# Patient Record
Sex: FEMALE | Race: WHITE | NOT HISPANIC OR LATINO | Employment: OTHER | URBAN - METROPOLITAN AREA
[De-identification: names, ages, dates, MRNs, and addresses within clinical notes are randomized per-mention and may not be internally consistent; named-entity substitution may affect disease eponyms.]

---

## 2017-01-04 ENCOUNTER — HOSPITAL ENCOUNTER (EMERGENCY)
Facility: HOSPITAL | Age: 52
Discharge: PSYCHIATRIC OR SUBSTANCE USE FACILITY | End: 2017-01-05
Attending: EMERGENCY MEDICINE
Payer: MEDICAID

## 2017-01-04 DIAGNOSIS — T14.91XA SUICIDE ATTEMPT: Primary | ICD-10-CM

## 2017-01-04 LAB
ALBUMIN SERPL BCP-MCNC: 3.5 G/DL
ALP SERPL-CCNC: 71 U/L
ALT SERPL W/O P-5'-P-CCNC: 17 U/L
AMPHET+METHAMPHET UR QL: NEGATIVE
ANION GAP SERPL CALC-SCNC: 11 MMOL/L
APAP SERPL-MCNC: 21 UG/ML
AST SERPL-CCNC: 18 U/L
B-HCG UR QL: NEGATIVE
BARBITURATES UR QL SCN>200 NG/ML: NEGATIVE
BASOPHILS # BLD AUTO: 0.1 K/UL
BASOPHILS NFR BLD: 0.7 %
BENZODIAZ UR QL SCN>200 NG/ML: NEGATIVE
BILIRUB SERPL-MCNC: 0.2 MG/DL
BILIRUB UR QL STRIP: NEGATIVE
BUN SERPL-MCNC: 6 MG/DL
BZE UR QL SCN: NEGATIVE
CALCIUM SERPL-MCNC: 8.6 MG/DL
CANNABINOIDS UR QL SCN: NEGATIVE
CHLORIDE SERPL-SCNC: 104 MMOL/L
CK SERPL-CCNC: 181 U/L
CLARITY UR: CLEAR
CO2 SERPL-SCNC: 23 MMOL/L
COLOR UR: YELLOW
CREAT SERPL-MCNC: 0.8 MG/DL
CREAT UR-MCNC: 14.1 MG/DL
CTP QC/QA: YES
DIFFERENTIAL METHOD: ABNORMAL
EOSINOPHIL # BLD AUTO: 0.2 K/UL
EOSINOPHIL NFR BLD: 1.9 %
ERYTHROCYTE [DISTWIDTH] IN BLOOD BY AUTOMATED COUNT: 15.7 %
EST. GFR  (AFRICAN AMERICAN): >60 ML/MIN/1.73 M^2
EST. GFR  (NON AFRICAN AMERICAN): >60 ML/MIN/1.73 M^2
ETHANOL SERPL-MCNC: 123 MG/DL
GLUCOSE SERPL-MCNC: 98 MG/DL
GLUCOSE UR QL STRIP: NEGATIVE
HCT VFR BLD AUTO: 30.8 %
HGB BLD-MCNC: 9.8 G/DL
HGB UR QL STRIP: ABNORMAL
KETONES UR QL STRIP: NEGATIVE
LEUKOCYTE ESTERASE UR QL STRIP: NEGATIVE
LYMPHOCYTES # BLD AUTO: 2.1 K/UL
LYMPHOCYTES NFR BLD: 23.2 %
MCH RBC QN AUTO: 24.7 PG
MCHC RBC AUTO-ENTMCNC: 31.9 %
MCV RBC AUTO: 78 FL
METHADONE UR QL SCN>300 NG/ML: NEGATIVE
MONOCYTES # BLD AUTO: 0.7 K/UL
MONOCYTES NFR BLD: 7.8 %
NEUTROPHILS # BLD AUTO: 6.1 K/UL
NEUTROPHILS NFR BLD: 66.4 %
NITRITE UR QL STRIP: NEGATIVE
OPIATES UR QL SCN: NEGATIVE
PCP UR QL SCN>25 NG/ML: NEGATIVE
PH UR STRIP: 6 [PH] (ref 5–8)
PLATELET # BLD AUTO: 354 K/UL
PMV BLD AUTO: 8.3 FL
POTASSIUM SERPL-SCNC: 3.7 MMOL/L
PROT SERPL-MCNC: 7 G/DL
PROT UR QL STRIP: NEGATIVE
RBC # BLD AUTO: 3.97 M/UL
SALICYLATES SERPL-MCNC: <5 MG/DL
SODIUM SERPL-SCNC: 138 MMOL/L
SP GR UR STRIP: <=1.005 (ref 1–1.03)
TOXICOLOGY INFORMATION: ABNORMAL
TSH SERPL DL<=0.005 MIU/L-ACNC: 1.7 UIU/ML
URN SPEC COLLECT METH UR: ABNORMAL
UROBILINOGEN UR STRIP-ACNC: NEGATIVE EU/DL
WBC # BLD AUTO: 9.2 K/UL

## 2017-01-04 PROCEDURE — 81003 URINALYSIS AUTO W/O SCOPE: CPT

## 2017-01-04 PROCEDURE — 96375 TX/PRO/DX INJ NEW DRUG ADDON: CPT

## 2017-01-04 PROCEDURE — 85025 COMPLETE CBC W/AUTO DIFF WBC: CPT

## 2017-01-04 PROCEDURE — 99291 CRITICAL CARE FIRST HOUR: CPT | Mod: 25

## 2017-01-04 PROCEDURE — 80307 DRUG TEST PRSMV CHEM ANLYZR: CPT

## 2017-01-04 PROCEDURE — 84443 ASSAY THYROID STIM HORMONE: CPT

## 2017-01-04 PROCEDURE — 36415 COLL VENOUS BLD VENIPUNCTURE: CPT

## 2017-01-04 PROCEDURE — 63600175 PHARM REV CODE 636 W HCPCS: Performed by: EMERGENCY MEDICINE

## 2017-01-04 PROCEDURE — 63600175 PHARM REV CODE 636 W HCPCS

## 2017-01-04 PROCEDURE — 93005 ELECTROCARDIOGRAM TRACING: CPT

## 2017-01-04 PROCEDURE — 80329 ANALGESICS NON-OPIOID 1 OR 2: CPT

## 2017-01-04 PROCEDURE — 96374 THER/PROPH/DIAG INJ IV PUSH: CPT

## 2017-01-04 PROCEDURE — 81025 URINE PREGNANCY TEST: CPT | Performed by: EMERGENCY MEDICINE

## 2017-01-04 PROCEDURE — 80320 DRUG SCREEN QUANTALCOHOLS: CPT

## 2017-01-04 PROCEDURE — 82570 ASSAY OF URINE CREATININE: CPT

## 2017-01-04 PROCEDURE — 80053 COMPREHEN METABOLIC PANEL: CPT

## 2017-01-04 PROCEDURE — 82550 ASSAY OF CK (CPK): CPT

## 2017-01-04 RX ORDER — MIDAZOLAM HYDROCHLORIDE 1 MG/ML
INJECTION, SOLUTION INTRAMUSCULAR; INTRAVENOUS
Status: COMPLETED
Start: 2017-01-04 | End: 2017-01-04

## 2017-01-04 RX ORDER — MIDAZOLAM HYDROCHLORIDE 5 MG/ML
2 INJECTION INTRAMUSCULAR; INTRAVENOUS
Status: COMPLETED | OUTPATIENT
Start: 2017-01-04 | End: 2017-01-04

## 2017-01-04 RX ORDER — DIAZEPAM 10 MG/2ML
5 INJECTION INTRAMUSCULAR
Status: ACTIVE | OUTPATIENT
Start: 2017-01-04 | End: 2017-01-05

## 2017-01-04 RX ORDER — CLONAZEPAM 0.5 MG/1
0.5 TABLET ORAL 2 TIMES DAILY PRN
COMMUNITY

## 2017-01-04 RX ORDER — DULOXETIN HYDROCHLORIDE 60 MG/1
60 CAPSULE, DELAYED RELEASE ORAL 2 TIMES DAILY
COMMUNITY

## 2017-01-04 RX ORDER — LORAZEPAM 2 MG/ML
1 INJECTION INTRAMUSCULAR
Status: COMPLETED | OUTPATIENT
Start: 2017-01-04 | End: 2017-01-04

## 2017-01-04 RX ORDER — GABAPENTIN 100 MG/1
100 CAPSULE ORAL 3 TIMES DAILY
COMMUNITY

## 2017-01-04 RX ORDER — LORAZEPAM 2 MG/ML
0.5 INJECTION INTRAMUSCULAR
Status: COMPLETED | OUTPATIENT
Start: 2017-01-04 | End: 2017-01-04

## 2017-01-04 RX ORDER — AMITRIPTYLINE HYDROCHLORIDE 50 MG/1
50 TABLET, FILM COATED ORAL NIGHTLY
COMMUNITY

## 2017-01-04 RX ORDER — DIAZEPAM 10 MG/2ML
5 INJECTION INTRAMUSCULAR
Status: COMPLETED | OUTPATIENT
Start: 2017-01-04 | End: 2017-01-04

## 2017-01-04 RX ADMIN — MIDAZOLAM 2 MG: 5 INJECTION INTRAMUSCULAR; INTRAVENOUS at 10:01

## 2017-01-04 RX ADMIN — LORAZEPAM 1 MG: 2 INJECTION INTRAMUSCULAR; INTRAVENOUS at 09:01

## 2017-01-04 RX ADMIN — LORAZEPAM 0.5 MG: 2 INJECTION INTRAMUSCULAR; INTRAVENOUS at 09:01

## 2017-01-04 RX ADMIN — DIAZEPAM 5 MG: 5 INJECTION, SOLUTION INTRAMUSCULAR; INTRAVENOUS at 11:01

## 2017-01-04 RX ADMIN — MIDAZOLAM HYDROCHLORIDE 2 MG: 1 INJECTION, SOLUTION INTRAMUSCULAR; INTRAVENOUS at 10:01

## 2017-01-05 VITALS
OXYGEN SATURATION: 100 % | RESPIRATION RATE: 16 BRPM | HEART RATE: 79 BPM | TEMPERATURE: 98 F | DIASTOLIC BLOOD PRESSURE: 67 MMHG | SYSTOLIC BLOOD PRESSURE: 113 MMHG | BODY MASS INDEX: 31.18 KG/M2 | WEIGHT: 176 LBS | HEIGHT: 63 IN

## 2017-01-05 LAB
APAP SERPL-MCNC: 5 UG/ML
ETHANOL SERPL-MCNC: 21 MG/DL

## 2017-01-05 PROCEDURE — 36415 COLL VENOUS BLD VENIPUNCTURE: CPT

## 2017-01-05 PROCEDURE — 80329 ANALGESICS NON-OPIOID 1 OR 2: CPT

## 2017-01-05 PROCEDURE — 80320 DRUG SCREEN QUANTALCOHOLS: CPT

## 2017-01-05 NOTE — ED PROVIDER NOTES
Encounter Date: 1/4/2017    SCRIBE #1 NOTE: I, Spike Singh, am scribing for, and in the presence of, Dr. Walsh.       History     Chief Complaint   Patient presents with    Drug Overdose     possible Klonopin, Elavil, Prozac and Neurontin of unknown amounts-pt overdosed this past February     Review of patient's allergies indicates:  No Known Allergies  HPI Comments: 01/04/2017  8:52 PM     Chief Complaint: Drug Overdose      Gianna Santos is a 52 y.o. female who is presenting with a sudden onset of an acute drug overdose beginning 1 hr ago. EMS reported the pt was found unresponsive after ingesting 6 beers, 6 Klonopin, 3 Neurontin, 3 amitriptyline, and 1 Cymbalta. EMS reported the pt has been depressed due to son passing away recently. EMS stated she has a hx of SI and PEC. Associated symptoms of chest pain and SI. The pt has no past medical history on file. She has a past surgical history that includes Neck surgery.    The history is provided by the patient.     Past Medical History   Diagnosis Date    Anxiety     Depression      No past medical history pertinent negatives.  Past Surgical History   Procedure Laterality Date    Neck surgery       No family history on file.  Social History   Substance Use Topics    Smoking status: Never Smoker    Smokeless tobacco: None    Alcohol use None     Review of Systems   Constitutional: Negative for fever.        + Drug Overdose   HENT: Negative for sore throat.    Respiratory: Negative for shortness of breath.    Cardiovascular: Positive for chest pain.   Gastrointestinal: Negative for nausea.   Genitourinary: Negative for dysuria.   Musculoskeletal: Negative for back pain.   Skin: Negative for rash.   Neurological: Negative for weakness.   Hematological: Does not bruise/bleed easily.   Psychiatric/Behavioral: Positive for suicidal ideas.       Physical Exam   Initial Vitals   BP Pulse Resp Temp SpO2   01/04/17 2048 01/04/17 2048 01/04/17 2048 01/04/17 2048  01/04/17 2048   119/71 96 16 97.5 °F (36.4 °C) 92 %     Physical Exam    Nursing note and vitals reviewed.  Constitutional: She appears well-developed.   HENT:   Head: Normocephalic and atraumatic.   Mouth/Throat: Oropharynx is clear and moist.   Eyes: Conjunctivae are normal.   Neck: Neck supple.   Cardiovascular: Normal rate, regular rhythm, normal heart sounds and intact distal pulses. Exam reveals no gallop and no friction rub.    No murmur heard.  Pulmonary/Chest: Breath sounds normal. She has no wheezes. She has no rhonchi. She has no rales.   Abdominal: Soft. She exhibits no distension. There is no tenderness.   Abdomen obese   Musculoskeletal: Normal range of motion.   Neurological:   GCS: eyes open at baseline, slurred speech, and spontaneous movement   Skin: No rash noted. No erythema.   Psychiatric: Her speech is slurred.   Somnolent  Answers some questions and follows some commands         ED Course   Critical Care  Date/Time: 1/5/2017 1:16 AM  Performed by: RUBIN LE  Authorized by: RUBIN LE   Direct patient critical care time: 50 minutes  Additional history critical care time: 5 minutes  Ordering / reviewing critical care time: 10 minutes  Documentation critical care time: 10 minutes  Total critical care time (exclusive of procedural time) : 75 minutes  Comments: Critical Care time: 75 minutes inclusive of direct patient care, review of previous records, interpretation of labs, imaging and ekg, as well as discussion of my impression and plan of care with the patient, family and other clinicians/consultants. This time is exclusive of any separate billable procedures and of treating other patients. Critical care was required for this patient who presented with drug overdose, followed by agitation and delirium requiring my emergent evaluation and management in the emergency department.          Labs Reviewed   CBC W/ AUTO DIFFERENTIAL - Abnormal; Notable for the following:        Result Value     RBC 3.97 (*)     Hemoglobin 9.8 (*)     Hematocrit 30.8 (*)     MCV 78 (*)     MCH 24.7 (*)     MCHC 31.9 (*)     RDW 15.7 (*)     Platelets 354 (*)     MPV 8.3 (*)     All other components within normal limits   COMPREHENSIVE METABOLIC PANEL - Abnormal; Notable for the following:     Calcium 8.6 (*)     All other components within normal limits   URINALYSIS - Abnormal; Notable for the following:     Specific Gravity, UA <=1.005 (*)     Occult Blood UA Trace (*)     All other components within normal limits   DRUG SCREEN PANEL, URINE EMERGENCY - Abnormal; Notable for the following:     Creatinine, Random Ur 14.1 (*)     All other components within normal limits   ALCOHOL,MEDICAL (ETHANOL) - Abnormal; Notable for the following:     Alcohol, Medical, Serum 123 (*)     All other components within normal limits   ACETAMINOPHEN LEVEL - Abnormal; Notable for the following:     Acetaminophen (Tylenol), Serum 21.0 (*)     All other components within normal limits   SALICYLATE LEVEL - Abnormal; Notable for the following:     Salicylate Lvl <5.0 (*)     All other components within normal limits   CK - Abnormal; Notable for the following:      (*)     All other components within normal limits   ACETAMINOPHEN LEVEL - Abnormal; Notable for the following:     Acetaminophen (Tylenol), Serum 5.0 (*)     All other components within normal limits   ALCOHOL,MEDICAL (ETHANOL) - Abnormal; Notable for the following:     Alcohol, Medical, Serum 21 (*)     All other components within normal limits   TSH   POCT URINE PREGNANCY             Medical Decision Making:   Initial Assessment:   8:57p  Called poison center who recommended watch for CNS, respiratory decrease, tachycardia, QRS widen seizure. Do not think that small amount of amitriptyline would cause significant toxicity but will watch for any changes.   Clinical Tests:   Lab Tests: Ordered and Reviewed  Medical Tests: Ordered and Reviewed            Scribe Attestation:    Scribe #1: I performed the above scribed service and the documentation accurately describes the services I performed. I attest to the accuracy of the note.    Attending Attestation:           Physician Attestation for Scribe:  Physician Attestation Statement for Scribe #1: I, Dr. Walsh, reviewed documentation, as scribed by Spike Singh in my presence, and it is both accurate and complete.         Attending ED Notes:   Course in the emergency department remarkable for patient awaking from her somnolent state and becoming acutely agitated, confused, delirious.  Patient not hypoxic on pulse ox.  No significant arrhythmia.  Patient was given multiple benzodiazepines, physically restrain as well, with subsequent correction of her agitation.    Patient became more calm and cooperative, more mentally alert and answering questions.  Few restraints were removed and patient still cooperating.    My overall impression is suicide attempt by drug overdose, alcohol intoxication, acute delirium. patient is PEC'd and pending repeat tylenol, ETOH for medical clearance. Pt does not have rhabdo based on CPK level.    Repeat evaluation in the morning and patient is comfortable, awake and answering questions.  The remainder of her to restraints were removed.  Repeat Tylenol and alcohol levels were unremarkable.  She is medically cleared at this time for psychiatric placement.          ED Course   Value Comment By Time   Alcohol, Medical, Serum: (!) 123 (Reviewed) Franck Walsh MD 01/04 2218   Acetaminophen (Tylenol), Serum: (!) 21.0 (Reviewed) Franck Walsh MD 01/04 2218   WBC: 9.20 (Reviewed) Franck Walsh MD 01/04 2218   Hemoglobin: (!) 9.8 (Reviewed) Franck Walsh MD 01/04 2218   Hematocrit: (!) 30.8 (Reviewed) Franck Walsh MD 01/04 2218   Sodium: 138 (Reviewed) Franck Walsh MD 01/04 2218   Potassium: 3.7 (Reviewed) Franck Walsh MD 01/04 2218   Chloride: 104 (Reviewed) Franck Walsh MD 01/04 2218   CO2: 23 (Reviewed)  Franck Walsh MD 01/04 2218   Glucose: 98 (Reviewed) Franck Walsh MD 01/04 2218   BUN, Bld: 6 (Reviewed) Franck Walsh MD 01/04 2218   Creatinine: 0.8 (Reviewed) Franck Walsh MD 01/04 2218   Preg Test, Ur: Negative (Reviewed) Franck Walsh MD 01/04 2218   Salicylate Lvl: (!) <5.0 (Reviewed) Franck Walsh MD 01/04 2218     Clinical Impression:   The primary encounter diagnosis was Suicide attempt. A diagnosis of Drug ingestion, intentional self-harm, initial encounter was also pertinent to this visit.          Franck Walsh MD  01/06/17 0646

## 2017-01-05 NOTE — ED NOTES
Pt behaving erratic - screaming -  thrashing around, Md called to bedside, advised to give the entire 2mg vial of ativan due to pt condition.

## 2017-01-05 NOTE — ED NOTES
Pt continues screaming - thrashing - broke right wrist restraint. Security at the bedside. Reapplied by Rn - md called to bedside. New orders given.

## 2017-01-05 NOTE — ED NOTES
Pt violent behavior continues - pt spitting / swinging/ kicking staff. Md and security at the bedside order for leather restraints given.

## 2017-01-05 NOTE — ED NOTES
PEC Received in [facility]. Will actively begin seeking inpatient psych placement.

## 2017-01-05 NOTE — ED NOTES
Patient accepted by Stephie at [facility], [address] [place] [address], for the service of Dr. Brewer.  Report to be called to [phone].

## 2017-01-05 NOTE — ED NOTES
Admit packet faxed to [facility], [facility], [facility], [facility], [facility], [facility], [facility], , [facility], [facility], [facility] and [facility]. Waiting for response.

## 2019-02-26 ENCOUNTER — HOSPITAL ENCOUNTER (EMERGENCY)
Facility: HOSPITAL | Age: 54
Discharge: HOME | End: 2019-02-26
Attending: EMERGENCY MEDICINE
Payer: MEDICAID

## 2019-02-26 VITALS
HEIGHT: 63 IN | HEART RATE: 80 BPM | RESPIRATION RATE: 14 BRPM | OXYGEN SATURATION: 97 % | SYSTOLIC BLOOD PRESSURE: 133 MMHG | TEMPERATURE: 99 F | DIASTOLIC BLOOD PRESSURE: 58 MMHG | BODY MASS INDEX: 31.18 KG/M2 | WEIGHT: 176 LBS

## 2019-02-26 DIAGNOSIS — R07.9 CHEST PAIN: ICD-10-CM

## 2019-02-26 DIAGNOSIS — M54.9 BACK PAIN, UNSPECIFIED BACK LOCATION, UNSPECIFIED BACK PAIN LATERALITY, UNSPECIFIED CHRONICITY: Primary | ICD-10-CM

## 2019-02-26 LAB
ALBUMIN SERPL BCP-MCNC: 4.1 G/DL
ALP SERPL-CCNC: 66 U/L
ALT SERPL W/O P-5'-P-CCNC: 17 U/L
ANION GAP SERPL CALC-SCNC: 10 MMOL/L
AST SERPL-CCNC: 16 U/L
BASOPHILS # BLD AUTO: 0 K/UL
BASOPHILS NFR BLD: 0.5 %
BILIRUB SERPL-MCNC: 0.3 MG/DL
BNP SERPL-MCNC: 37 PG/ML
BUN SERPL-MCNC: 12 MG/DL
CALCIUM SERPL-MCNC: 9.3 MG/DL
CHLORIDE SERPL-SCNC: 106 MMOL/L
CO2 SERPL-SCNC: 24 MMOL/L
CREAT SERPL-MCNC: 0.9 MG/DL
CRP SERPL-MCNC: 5 MG/L
D DIMER PPP IA.FEU-MCNC: 0.43 MG/L FEU
DIFFERENTIAL METHOD: ABNORMAL
EOSINOPHIL # BLD AUTO: 0.1 K/UL
EOSINOPHIL NFR BLD: 1.4 %
ERYTHROCYTE [DISTWIDTH] IN BLOOD BY AUTOMATED COUNT: 15.4 %
ERYTHROCYTE [SEDIMENTATION RATE] IN BLOOD BY WESTERGREN METHOD: 17 MM/HR
EST. GFR  (AFRICAN AMERICAN): >60 ML/MIN/1.73 M^2
EST. GFR  (NON AFRICAN AMERICAN): >60 ML/MIN/1.73 M^2
GLUCOSE SERPL-MCNC: 101 MG/DL
HCT VFR BLD AUTO: 36.6 %
HGB BLD-MCNC: 11.7 G/DL
LIPASE SERPL-CCNC: 27 U/L
LYMPHOCYTES # BLD AUTO: 2.1 K/UL
LYMPHOCYTES NFR BLD: 24.7 %
MCH RBC QN AUTO: 26.2 PG
MCHC RBC AUTO-ENTMCNC: 32 G/DL
MCV RBC AUTO: 82 FL
MONOCYTES # BLD AUTO: 0.6 K/UL
MONOCYTES NFR BLD: 6.6 %
NEUTROPHILS # BLD AUTO: 5.8 K/UL
NEUTROPHILS NFR BLD: 66.8 %
PLATELET # BLD AUTO: 280 K/UL
PMV BLD AUTO: 9.8 FL
POTASSIUM SERPL-SCNC: 3.6 MMOL/L
PROT SERPL-MCNC: 7.6 G/DL
RBC # BLD AUTO: 4.47 M/UL
SODIUM SERPL-SCNC: 140 MMOL/L
TROPONIN I SERPL DL<=0.01 NG/ML-MCNC: 0.01 NG/ML
WBC # BLD AUTO: 8.6 K/UL

## 2019-02-26 PROCEDURE — 84484 ASSAY OF TROPONIN QUANT: CPT

## 2019-02-26 PROCEDURE — 99285 EMERGENCY DEPT VISIT HI MDM: CPT | Mod: 25

## 2019-02-26 PROCEDURE — 80053 COMPREHEN METABOLIC PANEL: CPT

## 2019-02-26 PROCEDURE — 86140 C-REACTIVE PROTEIN: CPT

## 2019-02-26 PROCEDURE — 85379 FIBRIN DEGRADATION QUANT: CPT

## 2019-02-26 PROCEDURE — 93005 ELECTROCARDIOGRAM TRACING: CPT

## 2019-02-26 PROCEDURE — 63600175 PHARM REV CODE 636 W HCPCS: Performed by: EMERGENCY MEDICINE

## 2019-02-26 PROCEDURE — 83690 ASSAY OF LIPASE: CPT

## 2019-02-26 PROCEDURE — 85651 RBC SED RATE NONAUTOMATED: CPT

## 2019-02-26 PROCEDURE — 25000003 PHARM REV CODE 250: Performed by: EMERGENCY MEDICINE

## 2019-02-26 PROCEDURE — 83880 ASSAY OF NATRIURETIC PEPTIDE: CPT

## 2019-02-26 PROCEDURE — 96374 THER/PROPH/DIAG INJ IV PUSH: CPT

## 2019-02-26 PROCEDURE — 85025 COMPLETE CBC W/AUTO DIFF WBC: CPT

## 2019-02-26 PROCEDURE — 36415 COLL VENOUS BLD VENIPUNCTURE: CPT

## 2019-02-26 RX ORDER — IBUPROFEN 800 MG/1
800 TABLET ORAL 3 TIMES DAILY
COMMUNITY

## 2019-02-26 RX ORDER — ASPIRIN 325 MG
325 TABLET ORAL
Status: COMPLETED | OUTPATIENT
Start: 2019-02-26 | End: 2019-02-26

## 2019-02-26 RX ORDER — MORPHINE SULFATE 4 MG/ML
4 INJECTION, SOLUTION INTRAMUSCULAR; INTRAVENOUS
Status: COMPLETED | OUTPATIENT
Start: 2019-02-26 | End: 2019-02-26

## 2019-02-26 RX ADMIN — MORPHINE SULFATE 4 MG: 4 INJECTION, SOLUTION INTRAMUSCULAR; INTRAVENOUS at 01:02

## 2019-02-26 RX ADMIN — ASPIRIN 325 MG ORAL TABLET 325 MG: 325 PILL ORAL at 01:02

## 2019-02-26 NOTE — ED NOTES
Pt. AAOx4. Presents with complaints of LUQ and RUQ abdominal pain, right-sided back pain, and also states she has chest heaviness and SOB that started 2-3 days ago. NAD noted, denies NVD. BBS clear, S1S2 noted at PMI, 2+ radial pulses, B.S. Active x4 quads, voids spontaneously, 2+ DP pulses, no edema noted. VIRGILIO castro.

## 2019-02-26 NOTE — ED PROVIDER NOTES
Encounter Date: 2/26/2019    SCRIBE #1 NOTE: I, Farida Bhatt, am scribing for, and in the presence of, Roberto Cruz MD.       History     Chief Complaint   Patient presents with    Chest Pain     Pain across chest, back and L hip x 2 weeks, not relieved with massage.       Time seen by provider: 1:27 PM on 02/26/2019    Gianna Santos is a 54 y.o. female with depression and anxiety who presents to the ED with an onset of bilateral med back and mid upper abdominal pain. She began to endorse symptoms 2 weeks ago and was not able to get an appointment with her PCP. Since onset, the patient got a massage with no improvement in her pain. Her pain is worsened with deep breathing.  Some radiation to the chest.  She also complains of SOB and states she can hardly sit due to the pain. The patient endorses chronic back pain secondary to her sciatica with LLE numbness that is unchanged from baseline. She denies smoking tobacco, drinking alcohol, cold-like symptoms, or any other symptoms at this time. The patient has a SHx including a neck surgery ~5 years ago. No known drug allergies noted.      The history is provided by the patient.     Review of patient's allergies indicates:  No Known Allergies  Past Medical History:   Diagnosis Date    Anxiety     Depression      Past Surgical History:   Procedure Laterality Date    NECK SURGERY       History reviewed. No pertinent family history.  Social History     Tobacco Use    Smoking status: Never Smoker   Substance Use Topics    Alcohol use: Not on file    Drug use: Not on file     Review of Systems   Constitutional: Negative for fever.   HENT: Negative for postnasal drip, rhinorrhea and sore throat.    Respiratory: Positive for shortness of breath. Negative for cough.    Cardiovascular: Positive for chest pain. Negative for leg swelling.   Gastrointestinal: Positive for abdominal pain (epigastric). Negative for diarrhea, nausea and vomiting.   Genitourinary: Negative for  flank pain.   Musculoskeletal: Positive for back pain (thoracic).   Skin: Negative for rash.   Neurological: Positive for numbness (chronic LLE, unchanged). Negative for headaches.   All other systems reviewed and are negative.    Physical Exam     Initial Vitals [02/26/19 1313]   BP Pulse Resp Temp SpO2   (!) 163/73 88 14 98.8 °F (37.1 °C) 99 %      MAP       --         Physical Exam    Nursing note and vitals reviewed.  Constitutional: She appears well-developed and well-nourished. She is not diaphoretic.  Non-toxic appearance. She does not have a sickly appearance. She does not appear ill. No distress.   Well-appearing smiling jovial   HENT:   Head: Normocephalic and atraumatic.   Eyes: EOM are normal.   Neck: Normal range of motion and full passive range of motion without pain. Neck supple.   Cardiovascular: Normal rate, regular rhythm and normal heart sounds. Exam reveals no gallop and no friction rub.    No murmur heard.  Pulses:       Radial pulses are 2+ on the right side, and 2+ on the left side.        Dorsalis pedis pulses are 2+ on the right side, and 2+ on the left side.   Pulmonary/Chest: Breath sounds normal. No respiratory distress. She has no wheezes. She has no rhonchi. She has no rales.   Abdominal: She exhibits no distension. There is tenderness in the right upper quadrant and epigastric area. There is no rebound and no guarding.       Musculoskeletal: Normal range of motion.        Thoracic back: She exhibits tenderness and bony tenderness. She exhibits normal range of motion, no swelling and no edema.        Lumbar back: She exhibits tenderness and bony tenderness. She exhibits normal range of motion and no swelling.        Back:    T- and L-spine tenderness.    Neurological: She is alert and oriented to person, place, and time.   BLE hip flexion and extension is normal. Knee flex and ex also normal. EHL strength normal. Dorsal and plantar flexion of the foot is normal. Normal sensation to  light touch in feet in deep peroneal, superficial peroneal and saphenous and sural nerves.     Skin: Skin is warm and dry.   Psychiatric: She has a normal mood and affect. Her behavior is normal. Judgment and thought content normal.       ED Course   Procedures  Labs Reviewed   CBC W/ AUTO DIFFERENTIAL - Abnormal; Notable for the following components:       Result Value    Hemoglobin 11.7 (*)     Hematocrit 36.6 (*)     MCH 26.2 (*)     RDW 15.4 (*)     All other components within normal limits   COMPREHENSIVE METABOLIC PANEL   TROPONIN I   B-TYPE NATRIURETIC PEPTIDE   D DIMER, QUANTITATIVE   LIPASE   C-REACTIVE PROTEIN   SEDIMENTATION RATE        ECG Results          EKG 12-lead (Preliminary result)  Result time 02/26/19 13:25:44    ED Interpretation by Roberto Cruz MD (02/26/19 13:25:44)    Sinus rhythm no ST segment elevation or depression or T-wave inversion 83 beats per minute                            Imaging Results          CT Abdomen Pelvis  Without Contrast (Final result)  Result time 02/26/19 16:10:15    Final result by Marilou Ann MD (02/26/19 16:10:15)                 Impression:      No opaque renal or ureteral stone or ureteral obstruction.    Very mild nonspecific dilatation of small bowel left upper quadrant of the abdomen.    Additional findings as detailed above including normal appearance of the appendix.      Electronically signed by: Marilou Ann MD  Date:    02/26/2019  Time:    16:10             Narrative:    EXAMINATION:  CT ABDOMEN PELVIS WITHOUT CONTRAST    CLINICAL HISTORY:  Abdominal pain, unspecified;    TECHNIQUE:  Low dose axial images, sagittal and coronal reformations were obtained from the lung bases to the pubic symphysis.  No p.o. or IV contrast    COMPARISON:  None    FINDINGS:  Liver, gallbladder, bile ducts; unremarkable appearance of the liver.  No masses.  No calcified stones in the gallbladder or CT findings of acute cholecystitis.  No biliary duct  dilatation    Spleen; not enlarged    Adrenal glands; unremarkable appearance    Pancreas; unremarkable appearance    Abdominal aorta; no aneurysm    Stomach, bowel, mesentery normal appearance of the appendix.  No free intraperitoneal air or fluid.  Very mild nonspecific dilatation of small bowel left upper quadrant of the abdomen.  Mild diverticulosis versus incomplete distention of colon without CT findings of acute diverticulitis.    Reproductive organs; uterus and adnexal region unremarkable in appearance    Tiny fat containing umbilical hernia noted    Kidneys, ureters, bladder; unremarkable appearance of the kidneys.  No hydronephrosis opaque renal or ureteral stone or ureteral obstruction.  Urinary bladder mildly distended at time of the exam and unremarkable appearance    Osseous structures; degenerative changes noted in lucencies in the right acetabulum at the hip appearing degenerative. Also disc space narrowing in the lumbar spine.                               X-Ray Chest PA And Lateral (Final result)  Result time 02/26/19 13:56:15    Final result by Nick Ricardo MD (02/26/19 13:56:15)                 Impression:      No acute process.      Electronically signed by: Nick Ricardo MD  Date:    02/26/2019  Time:    13:56             Narrative:    EXAMINATION:  XR CHEST PA AND LATERAL    CLINICAL HISTORY:  Chest Pain;    TECHNIQUE:  PA and lateral views of the chest were performed.    COMPARISON:  None    FINDINGS:  The cardiomediastinal silhouette is with normal limits.  No consolidation, edema, or pleural effusion.  Cervical fusion hardware is seen.                                 Medical Decision Making:   History:   Old Medical Records: I decided to obtain old medical records.  Clinical Tests:   Lab Tests: Ordered and Reviewed  Radiological Study: Reviewed and Ordered  Medical Tests: Reviewed and Ordered            Scribe Attestation:   Scribe #1: I performed the above scribed service and  the documentation accurately describes the services I performed. I attest to the accuracy of the note.    I, Dr. Cruz, personally performed the services described in this documentation. All medical record entries made by the scribe were at my direction and in my presence.  I have reviewed the chart and agree that the record reflects my personal performance and is accurate and complete.5:47 PM 02/26/2019            ED Course as of Feb 26 1704   Tue Feb 26, 2019   1320 BP: (!) 163/73 [EF]   1320 Temp: 98.8 °F (37.1 °C) [EF]   1320 Temp src: Oral [EF]   1320 Pulse: 88 [EF]   1320 Resp: 14 [EF]   1320 SpO2: 99 % [EF]   1402 X-Ray Chest PA And Lateral [EF]   1424 Troponin I: 0.009 [EF]   1436 D-Dimer: 0.43 [EF]   1456 Sed Rate: 17 [EF]   1457 CRP: 5.0 [EF]   1513 1 points  Low Score (0-3 points)    Risk of MACE of 0.9-1.7%.  [EF]   1513 Scores 0-3: 0.9-1.7% risk of adverse cardiac event. In the HEART Score study, these patients were discharged (0.99% in the retrospective study, 1.7% in the prospective study)  [EF]   1517 Pain much better after morphine.  Low risk patient with a normal D-dimer rules out pulmonary embolism.  Lipase is normal ruling out pancreatitis troponin is normal with constant discomfort rules out MI.  Sed rate CRP are normal ruling out any infectious cause of her back pain such as diskitis epidural abscess or osteomyelitis.  Patient cannot see her primary care doctor for almost another month so a CT of the abdomen and pelvis review performed to rule out any intra-abdominal pathology causing her upper abdominal pain and back pain.  If this is normal she will be discharged home.  [EF]   1617 CT Abdomen Pelvis  Without Contrast [EF]   1701 54-year-old female presents to the ER with 2 weeks of constant back pain which radiates into the abdomen up between her shoulder blades and causes shortness of breath and also some chest discomfort.  Patient has thoracic paraspinal muscular tenderness.  Pain has  been constant for several weeks.  Negative troponin rules out MI.  Low risk patient with a negative D-dimer rules out PE.  I do not suspect an aortic dissection in this patient.  No sign at this time of any serious or life-threatening cause for symptoms such as a PE MI aortic dissection or any infectious cause of her back pain. No evidence of gallbladder pathology.  Morphine has almost completely eliminated her discomfort.  Pain sounds musculoskeletal.  No further testing is warranted.  EKG is normal sinus without any changes.  She can be discharged home.  Primary care apparently cannot see her until March 19th.  She is urged to move this appointment up and return to the ER if symptoms worsen or change.  [EF]      ED Course User Index  [EF] Roberto Cruz MD     Clinical Impression:       ICD-10-CM ICD-9-CM   1. Back pain, unspecified back location, unspecified back pain laterality, unspecified chronicity M54.9 724.5   2. Chest pain R07.9 786.50         Disposition:   Disposition: Discharged  Condition: Stable                        Roberto Cruz MD  02/26/19 8111

## 2019-08-21 RX ORDER — ALBUTEROL SULFATE 90 UG/1
AEROSOL, METERED RESPIRATORY (INHALATION)
Qty: 18 G | Refills: 0 | OUTPATIENT
Start: 2019-08-21

## 2019-12-19 RX ORDER — PREDNISONE 20 MG/1
TABLET ORAL
Qty: 10 TABLET | OUTPATIENT
Start: 2019-12-19

## 2019-12-19 RX ORDER — AMOXICILLIN AND CLAVULANATE POTASSIUM 875; 125 MG/1; MG/1
TABLET, FILM COATED ORAL
Qty: 14 TABLET | OUTPATIENT
Start: 2019-12-19

## 2020-01-20 ENCOUNTER — HOSPITAL ENCOUNTER (EMERGENCY)
Facility: HOSPITAL | Age: 55
Discharge: HOME | End: 2020-01-20
Attending: EMERGENCY MEDICINE
Payer: MEDICAID

## 2020-01-20 VITALS
OXYGEN SATURATION: 99 % | SYSTOLIC BLOOD PRESSURE: 118 MMHG | TEMPERATURE: 98 F | RESPIRATION RATE: 18 BRPM | HEIGHT: 63 IN | HEART RATE: 95 BPM | DIASTOLIC BLOOD PRESSURE: 60 MMHG | WEIGHT: 176 LBS | BODY MASS INDEX: 31.18 KG/M2

## 2020-01-20 DIAGNOSIS — R04.2 HEMOPTYSIS: ICD-10-CM

## 2020-01-20 LAB
ALBUMIN SERPL BCP-MCNC: 4.1 G/DL (ref 3.5–5.2)
ALP SERPL-CCNC: 73 U/L (ref 55–135)
ALT SERPL W/O P-5'-P-CCNC: 36 U/L (ref 10–44)
ANION GAP SERPL CALC-SCNC: 10 MMOL/L (ref 8–16)
APTT BLDCRRT: 29.4 SEC (ref 21–32)
AST SERPL-CCNC: 22 U/L (ref 10–40)
BASOPHILS # BLD AUTO: 0.07 K/UL (ref 0–0.2)
BASOPHILS NFR BLD: 0.9 % (ref 0–1.9)
BILIRUB SERPL-MCNC: 0.3 MG/DL (ref 0.1–1)
BUN SERPL-MCNC: 15 MG/DL (ref 6–20)
CALCIUM SERPL-MCNC: 9.7 MG/DL (ref 8.7–10.5)
CHLORIDE SERPL-SCNC: 102 MMOL/L (ref 95–110)
CO2 SERPL-SCNC: 29 MMOL/L (ref 23–29)
CREAT SERPL-MCNC: 0.8 MG/DL (ref 0.5–1.4)
D DIMER PPP IA.FEU-MCNC: 0.47 MG/L FEU
DIFFERENTIAL METHOD: ABNORMAL
EOSINOPHIL # BLD AUTO: 0.2 K/UL (ref 0–0.5)
EOSINOPHIL NFR BLD: 2.2 % (ref 0–8)
ERYTHROCYTE [DISTWIDTH] IN BLOOD BY AUTOMATED COUNT: 13.8 % (ref 11.5–14.5)
EST. GFR  (AFRICAN AMERICAN): >60 ML/MIN/1.73 M^2
EST. GFR  (NON AFRICAN AMERICAN): >60 ML/MIN/1.73 M^2
GLUCOSE SERPL-MCNC: 127 MG/DL (ref 70–110)
HCT VFR BLD AUTO: 39.2 % (ref 37–48.5)
HGB BLD-MCNC: 12.5 G/DL (ref 12–16)
IMM GRANULOCYTES # BLD AUTO: 0.04 K/UL (ref 0–0.04)
INR PPP: 0.9 (ref 0.8–1.2)
LYMPHOCYTES # BLD AUTO: 1.7 K/UL (ref 1–4.8)
LYMPHOCYTES NFR BLD: 20.8 % (ref 18–48)
MCH RBC QN AUTO: 28.9 PG (ref 27–31)
MCHC RBC AUTO-ENTMCNC: 31.9 G/DL (ref 32–36)
MCV RBC AUTO: 91 FL (ref 82–98)
MONOCYTES # BLD AUTO: 0.6 K/UL (ref 0.3–1)
MONOCYTES NFR BLD: 7.2 % (ref 4–15)
NEUTROPHILS # BLD AUTO: 5.6 K/UL (ref 1.8–7.7)
NEUTROPHILS NFR BLD: 68.4 % (ref 38–73)
NRBC BLD-RTO: 0 /100 WBC
PLATELET # BLD AUTO: 317 K/UL (ref 150–350)
PMV BLD AUTO: 11 FL (ref 9.2–12.9)
POTASSIUM SERPL-SCNC: 3.9 MMOL/L (ref 3.5–5.1)
PROT SERPL-MCNC: 8 G/DL (ref 6–8.4)
PROTHROMBIN TIME: 9.7 SEC (ref 9–12.5)
RBC # BLD AUTO: 4.33 M/UL (ref 4–5.4)
SODIUM SERPL-SCNC: 141 MMOL/L (ref 136–145)
WBC # BLD AUTO: 8.14 K/UL (ref 3.9–12.7)

## 2020-01-20 PROCEDURE — 93005 ELECTROCARDIOGRAM TRACING: CPT

## 2020-01-20 PROCEDURE — 99285 EMERGENCY DEPT VISIT HI MDM: CPT | Mod: 25

## 2020-01-20 PROCEDURE — 36415 COLL VENOUS BLD VENIPUNCTURE: CPT

## 2020-01-20 PROCEDURE — 85379 FIBRIN DEGRADATION QUANT: CPT

## 2020-01-20 PROCEDURE — 80053 COMPREHEN METABOLIC PANEL: CPT

## 2020-01-20 PROCEDURE — 85610 PROTHROMBIN TIME: CPT

## 2020-01-20 PROCEDURE — 85025 COMPLETE CBC W/AUTO DIFF WBC: CPT

## 2020-01-20 PROCEDURE — 85730 THROMBOPLASTIN TIME PARTIAL: CPT

## 2020-01-20 RX ORDER — DOXYCYCLINE 100 MG/1
100 CAPSULE ORAL 2 TIMES DAILY
Qty: 20 CAPSULE | Refills: 0 | Status: SHIPPED | OUTPATIENT
Start: 2020-01-20 | End: 2020-01-30

## 2020-01-20 NOTE — ED PROVIDER NOTES
Encounter Date: 1/20/2020    SCRIBE #1 NOTE: I, Stacy Milner, am scribing for, and in the presence of, Jose Roberto Juarez MD.       History   No chief complaint on file.    Time seen by provider: 11:41 AM on 01/20/2020    Gianna Santos is a 55 y.o. female who presents to the ED with an onset of coughing up blood clots for 1 day. She also c/o of upper back pain for 2 months and SOB for 1 year that she attributes to chronic bronchitis. Patient is a non-smoker that does not take blood-thinners. The patient denies swelling in her legs, blood in her urine or stool, nosebleeds, or any other symptoms at this time. Pertinent PMHx includes chronic bronchitis, depression, and anxiety. No pertinent PSHx. NKDA.      The history is provided by the patient.     Review of patient's allergies indicates:  No Known Allergies  Past Medical History:   Diagnosis Date    Anxiety     Depression      Past Surgical History:   Procedure Laterality Date    NECK SURGERY       No family history on file.  Social History     Tobacco Use    Smoking status: Never Smoker   Substance Use Topics    Alcohol use: Not on file    Drug use: Not on file     Review of Systems   Constitutional: Negative for fever.   HENT: Negative for nosebleeds.    Respiratory: Positive for shortness of breath.         Positive for hemoptysis.   Cardiovascular: Negative for leg swelling.   Gastrointestinal: Negative for blood in stool.   Genitourinary: Negative for flank pain and hematuria.   Musculoskeletal: Positive for back pain (upper). Negative for gait problem.   Neurological: Negative for weakness.   Hematological: Does not bruise/bleed easily.   Psychiatric/Behavioral: Negative for confusion.       Physical Exam     Initial Vitals   BP Pulse Resp Temp SpO2   -- -- -- -- --      MAP       --         Physical Exam    Nursing note and vitals reviewed.  Constitutional: She appears well-developed and well-nourished.   HENT:   Head: Normocephalic and atraumatic.    Eyes: Conjunctivae are normal.   Neck: Normal range of motion. Neck supple.   Cardiovascular: Normal rate, regular rhythm and normal heart sounds. Exam reveals no gallop and no friction rub.    No murmur heard.  Pulmonary/Chest: Effort normal and breath sounds normal. No respiratory distress. She has no wheezes. She has no rhonchi. She has no rales.   Abdominal: Soft. She exhibits no distension. There is no tenderness.   Musculoskeletal: Normal range of motion.   Neurological: She is alert and oriented to person, place, and time.   Skin: Skin is warm and dry. No erythema.   Psychiatric: She has a normal mood and affect.         ED Course   Procedures  Labs Reviewed   CBC W/ AUTO DIFFERENTIAL - Abnormal; Notable for the following components:       Result Value    Mean Corpuscular Hemoglobin Conc 31.9 (*)     All other components within normal limits   COMPREHENSIVE METABOLIC PANEL - Abnormal; Notable for the following components:    Glucose 127 (*)     All other components within normal limits   D DIMER, QUANTITATIVE   APTT   PROTIME-INR     EKG Readings: (Independently Interpreted)   Initial Reading: No STEMI. Rhythm: Normal Sinus Rhythm. Heart Rate: 85. Ectopy: No Ectopy. Conduction: Normal. ST Segments: Normal ST Segments. T Waves: Normal. Axis: Normal. Clinical Impression: Normal Sinus Rhythm   Normal sinus rhythm at a ventricular rate of 85 bpm with normal axis and normal intervals.          Imaging Results    None          Medical Decision Making:   History:   Old Medical Records: I decided to obtain old medical records.  Independently Interpreted Test(s):   I have ordered and independently interpreted EKG Reading(s) - see prior notes  Clinical Tests:   Lab Tests: Ordered and Reviewed  Radiological Study: Ordered and Reviewed  Medical Tests: Ordered and Reviewed  ED Management:  55-year-old female presents with hemoptysis.  Chest x-ray independently interpreted by me is normal with no evidence of lung mass,  pneumothorax or apical infiltrates.  She has no exposure to TB with no risk factors for same.  She has no lower extremity swelling, tachypnea or tachycardia with pulmonary embolism unlikely.  D-dimer is negative. She is treated empirically with doxycycline for possible bronchitis and is referred to Pulmonary Medicine for further workup.       APC / Resident Notes:   I, Dr. Jose Roberto Juarez III, personally performed the services described in this documentation. All medical record entries made by the scribe were at my direction and in my presence.  I have reviewed the chart and agree that the record reflects my personal performance and is accurate and complete       Scribe Attestation:   Scribe #1: I performed the above scribed service and the documentation accurately describes the services I performed. I attest to the accuracy of the note.                          Clinical Impression:       ICD-10-CM ICD-9-CM   1. Hemoptysis R04.2 786.30   2. Hemoptysis R04.2 786.30         Disposition:   Disposition: Discharged  Condition: Stable                     Jose Roberto Juarez III, MD  01/20/20 [id number]

## 2020-01-20 NOTE — ED NOTES
Pt awake alert oriented reports hemoptysis that started several days ago, pt denies chest pain or sob, denies n/v/d denies abd pain

## 2020-01-27 DIAGNOSIS — R13.10 DYSPHAGIA, UNSPECIFIED: Primary | ICD-10-CM

## 2020-01-31 ENCOUNTER — HOSPITAL ENCOUNTER (OUTPATIENT)
Dept: RADIOLOGY | Facility: HOSPITAL | Age: 55
Discharge: HOME | End: 2020-01-31
Attending: NURSE PRACTITIONER
Payer: MEDICAID

## 2020-01-31 DIAGNOSIS — R13.10 DYSPHAGIA, UNSPECIFIED: ICD-10-CM

## 2020-01-31 PROCEDURE — 74220 X-RAY XM ESOPHAGUS 1CNTRST: CPT | Mod: TC

## 2020-06-23 ENCOUNTER — HOSPITAL ENCOUNTER (OUTPATIENT)
Dept: RADIOLOGY | Facility: HOSPITAL | Age: 55
Discharge: HOME | End: 2020-06-23
Attending: NURSE PRACTITIONER
Payer: MEDICAID

## 2020-06-23 DIAGNOSIS — M54.2 CERVICALGIA: ICD-10-CM

## 2020-06-23 DIAGNOSIS — M54.50 LOW BACK PAIN: ICD-10-CM

## 2020-06-23 DIAGNOSIS — M54.50 LOW BACK PAIN: Primary | ICD-10-CM

## 2020-06-23 DIAGNOSIS — M54.2 CERVICALGIA: Primary | ICD-10-CM

## 2020-06-23 PROCEDURE — 72110 X-RAY EXAM L-2 SPINE 4/>VWS: CPT | Mod: TC,PO

## 2020-06-23 PROCEDURE — 72050 X-RAY EXAM NECK SPINE 4/5VWS: CPT | Mod: TC,PO

## 2020-07-06 ENCOUNTER — CLINICAL SUPPORT (OUTPATIENT)
Dept: REHABILITATION | Facility: HOSPITAL | Age: 55
End: 2020-07-06
Payer: MEDICAID

## 2020-07-06 DIAGNOSIS — M53.86 DECREASED RANGE OF MOTION OF LUMBAR SPINE: ICD-10-CM

## 2020-07-06 DIAGNOSIS — R29.898 LEFT LEG WEAKNESS: ICD-10-CM

## 2020-07-06 DIAGNOSIS — M54.16 LEFT LUMBAR RADICULOPATHY: ICD-10-CM

## 2020-07-06 PROCEDURE — 97110 THERAPEUTIC EXERCISES: CPT | Mod: PN

## 2020-07-06 PROCEDURE — 97161 PT EVAL LOW COMPLEX 20 MIN: CPT | Mod: PN
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
## 2020-07-07 DIAGNOSIS — Z12.31 OTHER SCREENING MAMMOGRAM: Primary | ICD-10-CM

## 2020-07-10 ENCOUNTER — CLINICAL SUPPORT (OUTPATIENT)
Dept: REHABILITATION | Facility: HOSPITAL | Age: 55
End: 2020-07-10
Payer: MEDICAID

## 2020-07-10 DIAGNOSIS — R29.898 LEFT LEG WEAKNESS: ICD-10-CM

## 2020-07-10 DIAGNOSIS — M54.16 LEFT LUMBAR RADICULOPATHY: ICD-10-CM

## 2020-07-10 DIAGNOSIS — M53.86 DECREASED RANGE OF MOTION OF LUMBAR SPINE: ICD-10-CM

## 2020-07-10 PROCEDURE — 97110 THERAPEUTIC EXERCISES: CPT | Mod: PN,CQ

## 2020-07-10 NOTE — PATIENT INSTRUCTIONS
- Remember to maintain your tummy tight with all of the exercises and do not overdo it.   - If any exercise causes and increase in your symptoms, stop immediately and notify your therapist at the next visit. If your pain is not controlled, consult with your physician.   - You may feel soreness tomorrow and the next day. This is normal after therapy and should resolve.       Isometric Abdominal        Lying on back with knees bent, tighten [id number] by pressing elbows down. Hold __5__ seconds.  Repeat _15___ times per set. Do __1__ sets per session. Do __1__ sessions per day.     [email or web address]://[email or web address]/1086     Copyright © [employer]. All rights reserved.   Strengthening: Hip Adduction - Isometric        With ball or folded pillow between knees, squeeze knees together. Hold _5___ seconds. Do not squeeze hard. This should be gentle.     Repeat _15___ times per set. Do __1__ sets per session. Do _1___ sessions per day.     [email or web address]://[employer]/612     Copyright © [employer]. All rights reserved.   Strengthening: Hip Abductor - Resisted        With firm strap or belt looped around both legs above knees, push thighs apart. Do not push hard. This should be gentle.     Repeat __15__ times per set. Do __1__ sets per session. Do __1__ sessions per day.     [email or web address]/688     Copyright © [employer]. All rights reserved.

## 2020-07-10 NOTE — PROGRESS NOTES
Physical Therapy Treatment Note     Name: Gianna Corrales Tom  Clinic Number: [id number]    Therapy Diagnosis:   Encounter Diagnoses   Name Primary?    Left lumbar radiculopathy     Decreased range of motion of lumbar spine     Left leg weakness      Physician: Karlie Isaac NP    Visit Date: 7/10/2020    Physician Orders: PT Eval and Treat   Medical Diagnosis from Referral: Cervicalgia and Low back pain  Evaluation Date: 7/6/2020  Authorization Period Expiration: 12/31/2020  Plan of Care Expiration: 8/14/2020  Visit # / Visits authorized: 1/ 12 (visit 2)    Time In: 0901  Time Out: 0945  Total Billable Time: 41 minutes (3 mins rest for BR break)  Precautions: Standard    Subjective     Pt reports: LBP with L LE symptoms and B foot tingling/numbness, which she states is her norm. Now taking gabapentin which seems to be helping.   She was compliant with home exercise program.  Response to previous treatment: improved pain (also taking gabapentin)  Functional change: none stated as this is first session since eval    Pain: 3/10  Location: bilateral low back      Objective     Billie received therapeutic exercises to develop strength, endurance, ROM, flexibility, posture and core stabilization for 41 minutes including:    Billie received therapeutic exercises to develop ROM and core stabilization for 41 minutes including:    SUPINE HOOKLYING ON TABLE:  Diaphragmatic breathing concomitant with MHP to LB   TrA sets with 5 second holds ([facility] 7/10/2020)  Ballsqueezes minimal force with 5 second holds with TrA setting ([facility] 7/10/2020)  Isometric clams with minimal force 15 with trA setting ([facility] 7/10/2020)    SEATED IN CHAIR:  Seated pelvic mobility (flexion/extension/neutral) x 5 min  Standing lumbar extension with arms on wall x 10 reps    Home Exercises Provided and Patient Education Provided     Education provided:   - Instructed proper technique of logroll for back protection.   - Educated pt that he/she may feel  "soreness after session.    - Reviewed proper sleep positioning with pillows in SL for back protection.     Written Home Exercises Provided: yes.  Exercises were reviewed and Billie was able to demonstrate them prior to the end of the session.  Billie demonstrated good  understanding of the education provided.     See EMR under Patient Instructions for exercises provided 7/10/2020.    Assessment     Initial guarding with gait and transfers but improved after MHP and stabilization ex's. Motivated. Self-seated piriformis stretches between ex's. ([place] from previous bout of PT at another clinic in the past. Notable decreased flexibility L piriformis vs R.)    Billie is progressing well towards her goals.   Pt prognosis is Fair.     Pt will continue to benefit from skilled outpatient physical therapy to address the deficits listed in the problem list box on initial evaluation, provide pt/family education and to maximize pt's level of independence in the home and community environment.     Pt's spiritual, cultural and educational needs considered and pt agreeable to plan of care and goals.     Anticipated barriers to physical therapy: chronic nature of symptoms; LLE radiculopathy is extremely reactive     Goals:   Short Term Goals (3 Weeks):   1. Pt will be compliant with HEP to supplement PT in decreasing pain with functional mobility.  2. Pt will perform lumbar stabilization progression of exercises and report centralization of LLE radicular symptoms.   3. Pt will improve lumbar ROM by 25% in all planes to improve functional mobility.  4. Pt will improve impaired LE and core MMTs to >/=4+/5 to improve strength for functional tasks.  Long Term Goals (6 Weeks):  1. Pt will improve FOTO score to </= 50% limited to decrease perceived limitation with maintaining/changing body position  2. Pt will improve lumbar AROM to WNL to improve functional mobility.   3. Pt will perform and hold TA contraction for 10x10" in dynamic standing " activities to demonstrate improved core strength  4. Pt will improve impaired LE MMTs to >/=5/5 to improve strength for functional tasks.  5. Pt will report pain </= 4/10 during lifting activities to promote functional mobility.       Plan     Continue per POC, progressing as appropriate.      Briana Palacios PTA .

## 2020-07-13 ENCOUNTER — CLINICAL SUPPORT (OUTPATIENT)
Dept: REHABILITATION | Facility: HOSPITAL | Age: 55
End: 2020-07-13
Payer: MEDICAID

## 2020-07-13 DIAGNOSIS — M53.86 DECREASED RANGE OF MOTION OF LUMBAR SPINE: ICD-10-CM

## 2020-07-13 DIAGNOSIS — R29.898 LEFT LEG WEAKNESS: ICD-10-CM

## 2020-07-13 DIAGNOSIS — M54.16 LEFT LUMBAR RADICULOPATHY: ICD-10-CM

## 2020-07-13 PROCEDURE — 97110 THERAPEUTIC EXERCISES: CPT | Mod: PN

## 2020-07-13 NOTE — PROGRESS NOTES
Physical Therapy Treatment Note     Name: Gianna Corrales Tom  Clinic Number: [id number]    Therapy Diagnosis:   Encounter Diagnoses   Name Primary?    Left lumbar radiculopathy     Decreased range of motion of lumbar spine     Left leg weakness      Physician: Karlie Isaac NP    Visit Date: 7/13/2020    Physician Orders: PT Eval and Treat   Medical Diagnosis from Referral: Cervicalgia and Low back pain  Evaluation Date: 7/6/2020  Authorization Period Expiration: 12/31/2020  Plan of Care Expiration: 8/14/2020  Visit # / Visits authorized: 2/ 12 (visit 3)    Time In: 0901  Time Out: 0943  Total Billable Time:   Precautions: Standard    Subjective     Pt reports: Numbness and burning pain in legs and feet is much better due to gabapentin.   She was compliant with home exercise program.  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    Pain: 2/10  Location: bilateral low back      Objective     Billie received therapeutic exercises to develop strength, endurance, ROM, flexibility, posture and core stabilization for 42 minutes including:    Billie received therapeutic exercises to develop ROM and core stabilization for 41 minutes including:    SUPINE HOOKLYING ON TABLE:  Diaphragmatic breathing concomitant with MHP to LB   TrA sets with 5 second holds (HEP 7/10/2020) 1x10  +Bridging with legs supported on peanut 2x10 (cues to perform lift on enhalation)   Ballsqueezes minimal force with 5 second holds with TrA setting (HEP 7/10/2020) x20  Alternating hip abd in hooklying with GTB and trA setting (HEP 7/10/2020) 2 x 10  +Supine piriformis stretch 3 x 30 sec  +Prone on elbows  +Prone hip flexor/quad stretch with strap 3.x.30 sec    SEATED IN CHAIR:  Seated pelvic mobility (flexion/extension/neutral) x 5 min  +Seated PB walk-out 10 x with 10 sec stretch  Standing lumbar extension with arms on wall x 10 reps       ADD  in future:  +Seated hamstring stretch 3 x 30 sec  Prone glute squeeze  Prone hip extension  PB iso abdominals  Abdominal marching    Home Exercises Provided and Patient Education Provided     Education provided:   - Instructed proper technique of logroll for back protection.   - Educated pt that he/she may feel soreness after session.    - Reviewed proper sleep positioning with pillows in SL for back protection.     Written Home Exercises Provided: yes.  Exercises were reviewed and Billie was able to demonstrate them prior to the end of the session.  Billie demonstrated good  understanding of the education provided.     See EMR under Patient Instructions for exercises provided 7/10/2020.    Assessment     Improved gait quality this morning with improved standing posture and less antalgia noted. Able to progress ex without pain provocation. Noted left piriformis and psoas muscle tightness with stretches vs right.     Billie is progressing well towards her goals.   Pt prognosis is Fair.     Pt will continue to benefit from skilled outpatient physical therapy to address the deficits listed in the problem list box on initial evaluation, provide pt/family education and to maximize pt's level of independence in the home and community environment.     Pt's spiritual, cultural and educational needs considered and pt agreeable to plan of care and goals.     Anticipated barriers to physical therapy: chronic nature of symptoms; LLE radiculopathy is extremely reactive     Goals:   Short Term Goals (3 Weeks):   1. Pt will be compliant with HEP to supplement PT in decreasing pain with functional mobility. In progress 7/13/2020    2. Pt will perform lumbar stabilization progression of exercises and report centralization of LLE radicular symptoms.  In progress 7/13/2020      3. Pt will improve lumbar ROM by 25% in all planes to improve functional mobility.  In progress 7/13/2020    4. Pt will improve impaired LE and core MMTs to >/=4+/5 to  "improve strength for functional tasks. In progress 7/13/2020    Long Term Goals (6 Weeks):  1. Pt will improve FOTO score to </= 50% limited to decrease perceived limitation with maintaining/changing body position  2. Pt will improve lumbar AROM to WNL to improve functional mobility.   3. Pt will perform and hold TA contraction for 10x10" in dynamic standing activities to demonstrate improved core strength  4. Pt will improve impaired LE MMTs to >/=5/5 to improve strength for functional tasks.  5. Pt will report pain </= 4/10 during lifting activities to promote functional mobility.       Plan     Continue per POC, progressing as appropriate.      Belinda Aguayo, PT .  "

## 2020-07-17 ENCOUNTER — CLINICAL SUPPORT (OUTPATIENT)
Dept: REHABILITATION | Facility: HOSPITAL | Age: 55
End: 2020-07-17
Payer: MEDICAID

## 2020-07-17 ENCOUNTER — HOSPITAL ENCOUNTER (OUTPATIENT)
Dept: RADIOLOGY | Facility: HOSPITAL | Age: 55
Discharge: HOME | End: 2020-07-17
Attending: NURSE PRACTITIONER
Payer: MEDICAID

## 2020-07-17 VITALS — BODY MASS INDEX: 31.17 KG/M2 | HEIGHT: 63 IN | WEIGHT: 175.94 LBS

## 2020-07-17 DIAGNOSIS — R29.898 LEFT LEG WEAKNESS: ICD-10-CM

## 2020-07-17 DIAGNOSIS — M54.16 LEFT LUMBAR RADICULOPATHY: ICD-10-CM

## 2020-07-17 DIAGNOSIS — Z12.31 OTHER SCREENING MAMMOGRAM: ICD-10-CM

## 2020-07-17 DIAGNOSIS — M53.86 DECREASED RANGE OF MOTION OF LUMBAR SPINE: ICD-10-CM

## 2020-07-17 PROCEDURE — 77067 SCR MAMMO BI INCL CAD: CPT | Mod: TC,PO

## 2020-07-17 PROCEDURE — 97110 THERAPEUTIC EXERCISES: CPT | Mod: PN

## 2020-07-17 NOTE — PROGRESS NOTES
Physical Therapy Treatment Note     Name: Gianna Corrales Tom  Clinic Number: [id number]    Therapy Diagnosis:   Encounter Diagnoses   Name Primary?    Left lumbar radiculopathy     Decreased range of motion of lumbar spine     Left leg weakness      Physician: Karlie Isaac NP    Visit Date: 7/17/2020    Physician Orders: PT Eval and Treat   Medical Diagnosis from Referral: Cervicalgia and Low back pain  Evaluation Date: 7/6/2020  Authorization Period Expiration: 12/31/2020  Plan of Care Expiration: 8/14/2020  Visit # / Visits authorized: 3/ 12 (visit 4)    Time In: 0900  Time Out: 938  Total Billable Time: 38  Precautions: Standard    Subjective     Pt reports: increased burning pain in LLE this morning. Does not recall any activity or movement that provoked. Reported relief of burning pain following treatment today.   She was compliant with home exercise program.  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    Pain: 3/10  Location:  Burning pain in left buttock and radiates into post thigh to knee    Objective     Billie received therapeutic exercises to develop strength, endurance, ROM, flexibility, posture and core stabilization for minutes including:    Billie received therapeutic exercises to develop ROM and core stabilization for 41 minutes including:    SUPINE HOOKLYING ON TABLE:  Diaphragmatic breathing concomitant with MHP to LB   TrA sets with 5 second holds (HEP 7/10/2020) 2x10  Bridging with legs supported on peanut 2x10 (cues to perform lift on enhalation)   Ballsqueezes minimal force with 5 second holds with TrA setting (HEP 7/10/2020) x20    +Seated piriformis stretch 3 x 30 sec  Prone on elbows 10x10 sec  Prone hip flexor/quad stretch with strap 3.x.30 sec  +Prone ball squeeze/glute set 2 x 10  +Prone hip extension 1x10  +Seated lumbar extension mobility           Not performed:  Alternating  hip abd in hooklying with GTB and trA setting (HEP 7/10/2020) 2 x 10  NP  SEATED IN CHAIR:  Seated pelvic mobility (flexion/extension/neutral) x 5 min  +Seated PB walk-out 10 x with 10 sec stretch    ADD in future:  Seated hamstring stretch 3 x 30 sec  Standing lumbar extension with arms on wall x 10 reps  PB iso abdominals  Abdominal marching    Home Exercises Provided and Patient Education Provided     Education provided:   -  Pt education on avoidance of flexion activities and frequent extension during the day.     Written Home Exercises Provided: yes.  Exercises were reviewed and Billie was able to demonstrate them prior to the end of the session.  Billie demonstrated good  understanding of the education provided.     See EMR under Patient Instructions for exercises provided 7/10/2020.    Assessment     Pt with noted increase in LE symptoms with flexion activities and improvement with extension.     Billie is progressing well towards her goals.   Pt prognosis is Fair.     Pt will continue to benefit from skilled outpatient physical therapy to address the deficits listed in the problem list box on initial evaluation, provide pt/family education and to maximize pt's level of independence in the home and community environment.     Pt's spiritual, cultural and educational needs considered and pt agreeable to plan of care and goals.     Anticipated barriers to physical therapy: chronic nature of symptoms; LLE radiculopathy is extremely reactive     Goals:   Short Term Goals (3 Weeks):   1. Pt will be compliant with HEP to supplement PT in decreasing pain with functional mobility. In progress 7/17/2020    2. Pt will perform lumbar stabilization progression of exercises and report centralization of LLE radicular symptoms.  In progress 7/17/2020      3. Pt will improve lumbar ROM by 25% in all planes to improve functional mobility.  In progress 7/17/2020    4. Pt will improve impaired LE and core MMTs to >/=4+/5 to improve  "strength for functional tasks. In progress 7/17/2020    Long Term Goals (6 Weeks):  1. Pt will improve FOTO score to </= 50% limited to decrease perceived limitation with maintaining/changing body position  2. Pt will improve lumbar AROM to WNL to improve functional mobility.   3. Pt will perform and hold TA contraction for 10x10" in dynamic standing activities to demonstrate improved core strength  4. Pt will improve impaired LE MMTs to >/=5/5 to improve strength for functional tasks.  5. Pt will report pain </= 4/10 during lifting activities to promote functional mobility.       Plan     Continue per POC, progressing as appropriate.      Belinda Aguayo, PT .    "

## 2020-07-20 ENCOUNTER — CLINICAL SUPPORT (OUTPATIENT)
Dept: REHABILITATION | Facility: HOSPITAL | Age: 55
End: 2020-07-20
Payer: MEDICAID

## 2020-07-20 DIAGNOSIS — M54.16 LEFT LUMBAR RADICULOPATHY: ICD-10-CM

## 2020-07-20 DIAGNOSIS — M53.86 DECREASED RANGE OF MOTION OF LUMBAR SPINE: ICD-10-CM

## 2020-07-20 DIAGNOSIS — R29.898 LEFT LEG WEAKNESS: ICD-10-CM

## 2020-07-20 PROCEDURE — 97110 THERAPEUTIC EXERCISES: CPT | Mod: PN

## 2020-07-20 PROCEDURE — 97140 MANUAL THERAPY 1/> REGIONS: CPT | Mod: PN

## 2020-07-20 NOTE — PATIENT INSTRUCTIONS
What To Expect After I ntegrative Dry Needling ® Treatments           How will I feel after a session of IDN?     You may feel some soreness immediately after treatment in the area of the body you were treated. This does not always occur but should be expected and is considered normal. It can also take up to a few hours, or even until the next day, to feel an onset of soreness. The soreness may vary from person to person and based on the area of the body that was treated, but it typically feels like you had an intense workout at the gym. Soreness typically lasts 24-48 hours. Make sure to indicate to your provider at a follow-up appointment how long the soreness lasted.   Bruising from the treatment is possible, somehow uncommon, but it is not of concern. Some areas are more likely to bruise than others, including the shoulders, chest, face, and portions of the extremities. Large bruising rarely occurs, but is possible. Use ice to help decrease the bruising and if you feel concern, please call your provider.    It is common to feel tired/fatigued, energized, emotional, giggly, or out of it after treatment. This is a normal response that can last up to an hour or two after treatment. If this lasts beyond a day, contact your provider as a precaution.   There are times when treatment may actually exacerbate your symptoms. This is normal and may indicate that you need to follow up sooner with your practitioner to continue treatment. If this continues past the 24-48 hour window, keep note of it, as this can help your provider adjust your treatment plan if needed based on your report. This does not mean that FDN cannot help your condition.    What should I do after my treatment and what is recommended?    We highly recommend increasing your water intake for the next 24 hours after treatment to help avoid or reduce soreness. We also recommend soaking in a hot bath or hot tub to help relieve post treatment soreness and  to soften the symptoms associated with the treatment you received. After dry needling treatment, you may do the following based on your comfort level. Please note that if it hurts or exacerbates your symptoms, then discontinuing the activity is best.     Work out and/or stretch.   Participate in normal physical activity.   Massage the area.   Use heat or ice as preferred for post treatment soreness.   If you have prescription medicines, continue to take them as prescribed.    What should I avoid after treatment?     Unfamiliar physical activities or sports.   Doing more than you normally do.   Excessive alcohol intake.  If you are feeling light headed, or experience difficulty breathing, chest pain, or any other concerning symptoms after treatment, call us immediately. If you are unable to get a hold of us, please call your physician.

## 2020-07-20 NOTE — PROGRESS NOTES
Physical Therapy Treatment Note     Name: Gianna Corrales Tom  Clinic Number: [id number]    Therapy Diagnosis:   Encounter Diagnoses   Name Primary?    Left lumbar radiculopathy     Decreased range of motion of lumbar spine     Left leg weakness      Physician: Karlie Isaac NP    Visit Date: 7/20/2020    Physician Orders: PT Eval and Treat   Medical Diagnosis from Referral: Cervicalgia and Low back pain  Evaluation Date: 7/6/2020  Authorization Period Expiration: 12/31/2020  Plan of Care Expiration: 8/14/2020  Visit # / Visits authorized: 4/ 12 (visit 5)    Time In: 9:20 AM  Time Out: 10:08 AM  Total Billable Time:  48 min  Precautions: Standard    Subjective     Pt reports: improved symptoms since avoiding flexion activities until she bent over to put shoes on this am and had increased pain.    She was compliant with home exercise program.  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    Pain: 2/10  Location:  Burning pain in left buttock and radiates into post thigh to knee    Objective     Billie received therapeutic exercises to develop strength, endurance, ROM, flexibility, posture and core stabilization for 33 minutes including:    +Seated hamstring stretch 3 x 30 sec  R Seated piriformis stretch 3 x 30 sec  Seated lumbar extension mobility on blue PB  +Seated alt shoulder flexion seated on PB 2# 2x10 (maintaining lumbar extension)  Prone on elbows 10x10 sec  +Prone glute sets x30  Prone hip flexor/quad stretch with strap 3.x.30 sec  Prone ball squeeze/glute set 2 x 10  Prone hip extension 2x10    Manual Therapy:  STM left glute medius and piriformis   Following informed written consent pt received Integrative Dry Needling as follows for 15 min:  Left SI joint with .[id number] needles and IMS applied at 2.0mA x 5 min  Left gluteus medius and piriformis TP's with .[id number] needles with IMS applied at 2.0mA  x 5 min  Pt had no adverse reaction to needling session and voiced understanding of post-needling instructions.     Not performed:  SUPINE HOOKLYING ON TABLE:  Diaphragmatic breathing concomitant with MHP to LB   TrA sets with 5 second holds (HEP 7/10/2020) 2x10  Bridging with legs supported on peanut 2x10 (cues to perform lift on enhalation)   Ballsqueezes minimal force with 5 second holds with TrA setting (HEP 7/10/2020) x20  Alternating hip abd in hooklying with GTB and trA setting (HEP 7/10/2020) 2 x 10  NP  SEATED IN CHAIR:  Seated pelvic mobility (flexion/extension/neutral) x 5 min  Seated PB walk-out 10 x with 10 sec stretch    ADD in future:  Standing lumbar extension with arms on wall x 10 reps  PB iso abdominals  Abdominal marching    Home Exercises Provided and Patient Education Provided     Education provided:   -  Pt education on avoidance of flexion activities and frequent extension during the day.   -Risks and benefits of dry needling/post needling instruction    Written Home Exercises Provided: yes.  Exercises were reviewed and Billie was able to demonstrate them prior to the end of the session.  Billie demonstrated good  understanding of the education provided.     See EMR under Patient Instructions for exercises provided 7/10/2020.    Assessment     Pt with noted TP's in left glute medius and piriformis mm with visible twitch response to needling.     Billie is progressing well towards her goals.   Pt prognosis is Fair.     Pt will continue to benefit from skilled outpatient physical therapy to address the deficits listed in the problem list box on initial evaluation, provide pt/family education and to maximize pt's level of independence in the home and community environment.     Pt's spiritual, cultural and educational needs considered and pt agreeable to plan of care and goals.     Anticipated barriers to physical therapy: chronic nature of symptoms; LLE radiculopathy is extremely reactive     Goals:  "  Short Term Goals (3 Weeks):   1. Pt will be compliant with HEP to supplement PT in decreasing pain with functional mobility. In progress 7/20/2020    2. Pt will perform lumbar stabilization progression of exercises and report centralization of LLE radicular symptoms.  In progress 7/20/2020      3. Pt will improve lumbar ROM by 25% in all planes to improve functional mobility.  In progress 7/20/2020    4. Pt will improve impaired LE and core MMTs to >/=4+/5 to improve strength for functional tasks. In progress 7/20/2020    Long Term Goals (6 Weeks):  1. Pt will improve FOTO score to </= 50% limited to decrease perceived limitation with maintaining/changing body position  2. Pt will improve lumbar AROM to WNL to improve functional mobility.   3. Pt will perform and hold TA contraction for 10x10" in dynamic standing activities to demonstrate improved core strength  4. Pt will improve impaired LE MMTs to >/=5/5 to improve strength for functional tasks.  5. Pt will report pain </= 4/10 during lifting activities to promote functional mobility.       Plan     Continue per POC, progressing as appropriate. Monitor patient's response to dry needling.       Belinda Aguayo, PT .      "

## 2020-07-24 ENCOUNTER — CLINICAL SUPPORT (OUTPATIENT)
Dept: REHABILITATION | Facility: HOSPITAL | Age: 55
End: 2020-07-24
Payer: MEDICAID

## 2020-07-24 DIAGNOSIS — M54.16 LEFT LUMBAR RADICULOPATHY: ICD-10-CM

## 2020-07-24 DIAGNOSIS — M53.86 DECREASED RANGE OF MOTION OF LUMBAR SPINE: ICD-10-CM

## 2020-07-24 DIAGNOSIS — R29.898 LEFT LEG WEAKNESS: ICD-10-CM

## 2020-07-24 PROCEDURE — 97110 THERAPEUTIC EXERCISES: CPT | Mod: PN

## 2020-07-24 NOTE — PROGRESS NOTES
"    Physical Therapy Treatment Note     Name: Gianna Corrales Tom  Clinic Number: [id number]    Therapy Diagnosis:   Encounter Diagnoses   Name Primary?    Left lumbar radiculopathy     Decreased range of motion of lumbar spine     Left leg weakness      Physician: Karlie Isaac NP    Visit Date: 7/24/2020    Physician Orders: PT Eval and Treat   Medical Diagnosis from Referral: Cervicalgia and Low back pain  Evaluation Date: 7/6/2020  Authorization Period Expiration: 12/31/2020  Plan of Care Expiration: 8/14/2020  Visit # / Visits authorized: 5/ 12 (visit 6)    Time In: 9:15 AM  Time Out: 9:57 AM  Total Billable Time:  42 min  Precautions: Standard    Subjective     Pt reports: improved symptoms with resolving left thigh pain; doing ex throughout day; feels needling helped. Primary c/o muscle "knot" in left superior buttock.     She was compliant with home exercise program.  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   Seated alt shoulder flexion seated on PB 2# 2x10 (maintaining lumbar extension) NP  Prone on elbows 10x10 sec  Prone glute sets x30  Prone hip flexor/quad stretch with strap 3.x.30 sec  Prone ball squeeze/glute set 2 x 10  Prone hip extension 2x10    Future visits:  +Quadruped cat  +Quadruped hip extension    Manual Therapy:  STM left glute medius and piriformis   Following informed written consent pt received Integrative Dry Needling as follows for 12 " min:  Bilateral L5 paraspinals and Left SI joint with .[id number] needles and IMS applied at 2.0mA x 5 min  Left gluteus medius TP's with .[id number] needles with IMS applied at 2.0mA x 5 min  Pt had no adverse reaction to needling session and voiced understanding of post-needling instructions.       Not performed:  SUPINE HOOKLYING ON TABLE:  Diaphragmatic breathing concomitant with MHP to LB   TrA sets with 5 second holds (HEP 7/10/2020) 2x10  Bridging with legs supported on peanut 2x10 (cues to perform lift on enhalation)   Ballsqueezes minimal force with 5 second holds with TrA setting (HEP 7/10/2020) x20  Alternating hip abd in hooklying with GTB and trA setting (HEP 7/10/2020) 2 x 10  NP  SEATED IN CHAIR:  Seated pelvic mobility (flexion/extension/neutral) x 5 min  Seated PB walk-out 10 x with 10 sec stretch    ADD in future:  Standing lumbar extension with arms on wall x 10 reps  PB iso abdominals  Abdominal marching    Home Exercises Provided and Patient Education Provided     Education provided:   -  Pt education on avoidance of flexion activities and frequent extension during the day.   -Risks and benefits of dry needling/post needling instruction    Written Home Exercises Provided: yes.  Exercises were reviewed and Billie was able to demonstrate them prior to the end of the session.  Billie demonstrated good  understanding of the education provided.     See EMR under Patient Instructions for exercises provided 7/10/2020.    Assessment     Pt with centralizing LLE radicular symptoms, however, pain is easily provoked with  lumbar flexion activities (I.e. seated flexion seated on P ball)  Billie is progressing well towards her goals.   Pt prognosis is Fair.     Pt will continue to benefit from skilled outpatient physical therapy to address the deficits listed in the problem list box on initial evaluation, provide pt/family education and to maximize pt's level of independence in the home and community environment.     Pt's  "spiritual, cultural and educational needs considered and pt agreeable to plan of care and goals.     Anticipated barriers to physical therapy: chronic nature of symptoms; LLE radiculopathy is extremely reactive     Goals:   Short Term Goals (3 Weeks):   1. Pt will be compliant with HEP to supplement PT in decreasing pain with functional mobility. In progress 7/24/2020    2. Pt will perform lumbar stabilization progression of exercises and report centralization of LLE radicular symptoms.  In progress 7/24/2020      3. Pt will improve lumbar ROM by 25% in all planes to improve functional mobility.  In progress 7/24/2020    4. Pt will improve impaired LE and core MMTs to >/=4+/5 to improve strength for functional tasks. In progress 7/24/2020    Long Term Goals (6 Weeks):  1. Pt will improve FOTO score to </= 50% limited to decrease perceived limitation with maintaining/changing body position  2. Pt will improve lumbar AROM to WNL to improve functional mobility.   3. Pt will perform and hold TA contraction for 10x10" in dynamic standing activities to demonstrate improved core strength  4. Pt will improve impaired LE MMTs to >/=5/5 to improve strength for functional tasks.  5. Pt will report pain </= 4/10 during lifting activities to promote functional mobility.       Plan     Continue per POC, progressing as appropriate.       Belinda Aguayo, PT .      "

## 2020-07-27 ENCOUNTER — CLINICAL SUPPORT (OUTPATIENT)
Dept: REHABILITATION | Facility: HOSPITAL | Age: 55
End: 2020-07-27
Payer: MEDICAID

## 2020-07-27 DIAGNOSIS — R29.3 POSTURE IMBALANCE: ICD-10-CM

## 2020-07-27 DIAGNOSIS — M54.2 PAINFUL CERVICAL RANGE OF MOTION: ICD-10-CM

## 2020-07-27 DIAGNOSIS — M53.86 DECREASED RANGE OF MOTION OF LUMBAR SPINE: ICD-10-CM

## 2020-07-27 DIAGNOSIS — G89.29 CHRONIC NECK PAIN: ICD-10-CM

## 2020-07-27 DIAGNOSIS — R29.898 LEFT LEG WEAKNESS: ICD-10-CM

## 2020-07-27 DIAGNOSIS — M54.16 LEFT LUMBAR RADICULOPATHY: ICD-10-CM

## 2020-07-27 DIAGNOSIS — M54.2 CHRONIC NECK PAIN: ICD-10-CM

## 2020-07-27 PROCEDURE — 97140 MANUAL THERAPY 1/> REGIONS: CPT | Mod: PN

## 2020-07-27 PROCEDURE — 97110 THERAPEUTIC EXERCISES: CPT | Mod: PN

## 2020-07-27 NOTE — PLAN OF CARE
Outpatient Therapy Updated Plan of Care     Visit Date: 7/27/2020    Name: Gianna Santos  Clinic Number: [id number]    Therapy Diagnosis:   Encounter Diagnoses   Name Primary?    Left lumbar radiculopathy     Decreased range of motion of lumbar spine     Left leg weakness     Chronic neck pain     Posture imbalance     Painful cervical range of motion      Physician: Karlie Isaac NP     Physician Orders: PT Eval and Treat   Medical Diagnosis from Referral: Cervicalgia and Low back pain  Evaluation Date: 7/6/2020  Authorization Period Expiration: 12/31/2020  Plan of Care Expiration: 8/14/2020  Visit # / Visits authorized: 6/ 12 (visit 7)  Precautions: Standard      Subjective     Update: Patient reports gradual improvement in lumbar symptoms with centralization of LLE symptoms although these symptoms are easily provoked with flexion movement.  Pt states she has had cervical pain for many years that was helped with ACDF in 2015, however she continues to have pain with certain movements and radicular symptoms in BUE's to the hands with cervical flexion in particular. She states she feels pins and needles in bilateral hands and feet with cervical flexion.     OBJECTIVE  Posture: Forward head with Dowager's hump  Palpation: Tender C7 TP and bilateral upper trapezius mm  Sensation: Grossly intact to light touch  DTRs: +2 biceps and triceps    Range of Motion/Strength:   CERVICAL AROM Pain/Dysfunction with Movement   Flexion 30 Bilateral cervical and BUE radicular pain/tingling in hands and feet   Extension 40 Central cervical pain   Right side bending 30    Left side bending 30    Right rotation 70 Ipsilateral cervical pain   Left rotation 70      Shoulder AROM/strength:   BUE AROM is WNL  Strength of BUE's is 4/5-4+/5 shoulders and 4+/5 - 5/5 elbows and wrists    Special Tests:  - compression  -distraction  -Vertebral artery    Assessment     Update: Pt presents with chronic cervical pain with  "radicular symptoms into BUE's with flexion. AROM is limited. Pt demonstrates poor posture with forward head and mild weakness of shoulder/scapular mm. Pt will benefit with addition of cervical treatment to address posture with strengthening/stretching program. Will also benefit from dry needling of cervical and upper trap region.   Will continue to progress lumbar mobility/stability program per original POC.     Goals: (New cervical goals added)  Short Term Goals (3 Weeks):   1. Pt will be compliant with HEP to supplement PT in decreasing pain with functional mobility. In progress 7/24/2020     2. Pt will perform lumbar stabilization progression of exercises and report centralization of LLE radicular symptoms.  In progress 7/24/2020        3. Pt will improve lumbar ROM by 25% in all planes to improve functional mobility.  In progress 7/24/2020     4. Pt will improve impaired LE and core MMTs to >/=4+/5 to improve strength for functional tasks. In progress 7/24/2020    *5. Pt will demonstrated improved cervical AROM by 5 deg all planes to improve functional mobility of neck.   *6. Pt will progress postural stability ex program without exacerbation of radicular symptoms.      Long Term Goals (6 Weeks):  1. Pt will improve FOTO score to </= 50% limited to decrease perceived limitation with maintaining/changing body position  2. Pt will improve lumbar AROM to WNL to improve functional mobility.   3. Pt will perform and hold TA contraction for 10x10" in dynamic standing activities to demonstrate improved core strength  4. Pt will improve impaired LE MMTs to >/=5/5 to improve strength for functional tasks.  5. Pt will report pain </= 4/10 during lifting activities to promote functional mobility.  *6. 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   Plan     Updated Certification Period: 7/27/2020 to 8/14/2020  Recommended Treatment Plan: 2 times " per week for 3 weeks: Electrical Stimulation IMS with dry needling, Manual Therapy, Moist Heat/ Ice, Patient Education, Therapeutic Activites and Therapeutic Exercise      Belinda Aguayo, PT  7/27/2020

## 2020-07-27 NOTE — PROGRESS NOTES
Physical Therapy Treatment Note     Name: Gianna Corrales Tom  Clinic Number: [id number]    Therapy Diagnosis:   Encounter Diagnoses   Name Primary?    Left lumbar radiculopathy     Decreased range of motion of lumbar spine     Left leg weakness     Chronic neck pain     Posture imbalance     Painful cervical range of motion      Physician: Karlie Isaac NP    Visit Date: 7/27/2020    Physician Orders: PT Eval and Treat   Medical Diagnosis from Referral: Cervicalgia and Low back pain  Evaluation Date: 7/6/2020  Authorization Period Expiration: 12/31/2020  Plan of Care Expiration: 8/14/2020  Visit # / Visits authorized: 6/ 12 (visit 7)    Time In: 9:20 AM  Time Out: 10:05 AM  Total Billable Time: 45  min  Precautions: Standard    Subjective     Pt reports: improved symptoms with resolving left thigh pain, then bent over to tie shoe and felt increased burning pain in L buttock. Pt is reporting cervical pain (chronic) and states she has bilateral neck pain with radicular symptoms into bilateral UE's with flexion. Pt requesting treatment of cervical symptoms since her lumbar pain is improving. (Referral was for cervical and lumbar pain.)       She was compliant with home exercise program.  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    Pain: 1.5/10  5/5 in cervical spine  Location:  Burning pain in left buttock; bilateral cervical spine - at base of neck    Objective     Billie received therapeutic exercises to develop strength, endurance, ROM, flexibility, posture and core stabilization for 30 minutes including:  Prone on elbows 10x10 sec  Prone hip flexor/quad stretch with strap 3.x.30 sec  Seated hamstring stretch 3 x 30 sec  +seated chin tucks 1 x 10  +shoulder retro rolls 1 x 10  +scapula squeezes 1 x 10  +Brueggers with RTB 1 x 10 hold 5 sec  +UT and levator stretches 3 x 15 sec ea    Not performed:  R  Seated piriformis stretch 3 x 30 sec  Seated lumbar extension mobility on blue PB 2 x 10 **Pt began having burning pain in left buttock with lumbar flexion mobility at end of reps.   Seated alt shoulder flexion seated on PB 2# 2x10 (maintaining lumbar extension) NP  Prone glute sets x30  Prone ball squeeze/glute set 2 x 10  Prone hip extension 2x10    Future visits:  +Quadruped cat  +Quadruped hip extension    Manual Therapy:    Following informed written consent pt received Integrative Dry Needling as follows for 15 min:  Bilateral L5 paraspinals and Left SI joint with .[id number] needles and IMS applied at 2.0mA x 5 min  Left gluteus medius TP's with .13x003wp needles with IMS applied at 2.0mA x 5 min  +Bilateral C5-7 with .12e0gks needles with IMS applied at 2.0mA x 5min  +Bilateral UT with .39m49zz needles with visible muscle twitch response L UT.     Pt had no adverse reaction to needling session and voiced understanding of post-needling instructions.       Not performed:  SUPINE HOOKLYING ON TABLE:  Diaphragmatic breathing concomitant with MHP to LB   TrA sets with 5 second holds (HEP 7/10/2020) 2x10  Bridging with legs supported on peanut 2x10 (cues to perform lift on enhalation)   Ballsqueezes minimal force with 5 second holds with TrA setting (HEP 7/10/2020) x20  Alternating hip abd in hooklying with GTB and trA setting (HEP 7/10/2020) 2 x 10  NP  SEATED IN CHAIR:  Seated pelvic mobility (flexion/extension/neutral) x 5 min  Seated PB walk-out 10 x with 10 sec stretch    ADD in future:  Standing lumbar extension with arms on wall x 10 reps  PB iso abdominals  Abdominal marching    Home Exercises Provided and Patient Education Provided     Education provided:   -  Pt education on avoidance of flexion activities and frequent extension during the day.   -Risks and benefits of dry needling/post needling instruction  -Postural ex with HEP provided    Written Home Exercises Provided: yes.  Exercises were reviewed and  "Billie was able to demonstrate them prior to the end of the session.  Billie demonstrated good  understanding of the education provided.     See EMR under Patient Instructions for exercises provided 7/27/2020.    Assessment   See updated POC for cervical assessment.   Pt with centralizing LLE radicular symptoms, however, pain is easily provoked with  lumbar flexion activities (I.e. seated flexion seated on P ball)  Billie is progressing well towards her goals.   Pt prognosis is Fair.     Pt will continue to benefit from skilled outpatient physical therapy to address the deficits listed in the problem list box on initial evaluation, provide pt/family education and to maximize pt's level of independence in the home and community environment.     Pt's spiritual, cultural and educational needs considered and pt agreeable to plan of care and goals.     Anticipated barriers to physical therapy: chronic nature of symptoms; LLE radiculopathy is extremely reactive     Goals:   Short Term Goals (3 Weeks):   1. Pt will be compliant with HEP to supplement PT in decreasing pain with functional mobility. In progress 7/27/2020    2. Pt will perform lumbar stabilization progression of exercises and report centralization of LLE radicular symptoms.  In progress 7/27/2020      3. Pt will improve lumbar ROM by 25% in all planes to improve functional mobility.  In progress 7/27/2020    4. Pt will improve impaired LE and core MMTs to >/=4+/5 to improve strength for functional tasks. In progress 7/27/2020    Long Term Goals (6 Weeks):  1. Pt will improve FOTO score to </= 50% limited to decrease perceived limitation with maintaining/changing body position  2. Pt will improve lumbar AROM to WNL to improve functional mobility.   3. Pt will perform and hold TA contraction for 10x10" in dynamic standing activities to demonstrate improved core strength  4. Pt will improve impaired LE MMTs to >/=5/5 to improve strength for functional tasks.  5. Pt will " report pain </= 4/10 during lifting activities to promote functional mobility.       Plan     Continue per POC, progressing as appropriate.       Belinda Aguayo PT .

## 2020-07-31 ENCOUNTER — DOCUMENTATION ONLY (OUTPATIENT)
Dept: REHABILITATION | Facility: HOSPITAL | Age: 55
End: 2020-07-31

## 2020-07-31 ENCOUNTER — CLINICAL SUPPORT (OUTPATIENT)
Dept: REHABILITATION | Facility: HOSPITAL | Age: 55
End: 2020-07-31
Payer: MEDICAID

## 2020-07-31 DIAGNOSIS — M54.16 LEFT LUMBAR RADICULOPATHY: ICD-10-CM

## 2020-07-31 DIAGNOSIS — M54.2 PAINFUL CERVICAL RANGE OF MOTION: ICD-10-CM

## 2020-07-31 DIAGNOSIS — M54.2 CHRONIC NECK PAIN: ICD-10-CM

## 2020-07-31 DIAGNOSIS — M53.86 DECREASED RANGE OF MOTION OF LUMBAR SPINE: ICD-10-CM

## 2020-07-31 DIAGNOSIS — G89.29 CHRONIC NECK PAIN: ICD-10-CM

## 2020-07-31 DIAGNOSIS — R29.3 POSTURE IMBALANCE: ICD-10-CM

## 2020-07-31 DIAGNOSIS — R29.898 LEFT LEG WEAKNESS: ICD-10-CM

## 2020-07-31 PROCEDURE — 97110 THERAPEUTIC EXERCISES: CPT | Mod: PN,CQ

## 2020-07-31 NOTE — PROGRESS NOTES
PT/PTA met face to face to discuss pt's treatment plan and progress towards established goals. Pt will be seen by a physical therapist minimally every 6th visit or every 30 days.    Please see Updated Plan of Care dated 7/27/20 for changes and updated goals.    Esther Dao, DEXTER

## 2020-07-31 NOTE — PROGRESS NOTES
"  Physical Therapy Treatment Note     Name: Gianna Corrales Tom  Clinic Number: [id number]    Therapy Diagnosis:   Encounter Diagnoses   Name Primary?    Chronic neck pain     Posture imbalance     Painful cervical range of motion     Left lumbar radiculopathy     Decreased range of motion of lumbar spine     Left leg weakness      Physician: Karlie Isaac NP    Visit Date: 7/31/2020    Physician Orders: PT Eval and Treat   Medical Diagnosis from Referral: Cervicalgia and Low back pain  Evaluation Date: 7/6/2020  Authorization Period Expiration: 12/31/2020  Plan of Care Expiration: 8/14/2020  Visit # / Visits authorized: 7/ 12 (visit 8)    Time In: 9:03 AM  Time Out: 945 AM  Total Billable Time: 42 min  Precautions: Standard    Subjective     Pt reports: just a little burning today in the L.  Reports in the last couple days she has noticed a knot in her R gastroc. The neck pain comes and goes.   She was compliant with home exercise program.  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    Pain: 2/10  1.5/10 in cervical spine  Location:  Burning pain in left buttock; bilateral cervical spine - at base of neck    Objective     Billie received therapeutic exercises to develop strength, endurance, ROM, flexibility, posture and core stabilization for 42 minutes including:  Prone on elbows 10x10 sec  Prone hip flexor/quad stretch with strap 3.x.30 sec  Seated hamstring stretch 3 x 30 sec  +R gastroc stretch 3x30   seated chin tucks 1 x 10  shoulder retro rolls 1 x 10  scapula squeezes 20x5"  Brueggers with RTB 2 x 10 hold 5 sec  UT and levator stretches 3 x 15 sec ea  Seated alt shoulder flexion seated on PB 2# 2x10 (maintaining lumbar extension)    Not performed:  R Seated piriformis stretch 3 x 30 sec  Seated lumbar extension mobility on blue PB 2 x 10 **Pt began having burning pain in left buttock with lumbar " flexion mobility at end of reps.   Prone glute sets x30  Prone ball squeeze/glute set 2 x 10  Prone hip extension 2x10    Future visits:  +Quadruped cat  +Quadruped hip extension      Not performed:  SUPINE HOOKLYING ON TABLE:  Diaphragmatic breathing concomitant with MHP to LB   TrA sets with 5 second holds (HEP 7/10/2020) 2x10  Bridging with legs supported on peanut 2x10 (cues to perform lift on enhalation)   Ballsqueezes minimal force with 5 second holds with TrA setting (HEP 7/10/2020) x20  Alternating hip abd in hooklying with GTB and trA setting (HEP 7/10/2020) 2 x 10  NP  SEATED IN CHAIR:  Seated pelvic mobility (flexion/extension/neutral) x 5 min  Seated PB walk-out 10 x with 10 sec stretch    ADD in future:  Standing lumbar extension with arms on wall x 10 reps  PB iso abdominals  Abdominal marching    Home Exercises Provided and Patient Education Provided     Education provided:   - DVT signs and symptoms and to seek medical attention if necessary.     Written Home Exercises Provided: continue HEP.  Exercises were reviewed and Billie was able to demonstrate them prior to the end of the session.  Billie demonstrated good  understanding of the education provided.     See EMR under Patient Instructions for exercises provided 7/27/2020.    Assessment   Good tolerance to exercises performed today. Added gastroc stretch for tightness, tolerated well.   Billie is progressing well towards her goals.   Pt prognosis is Fair.     Pt will continue to benefit from skilled outpatient physical therapy to address the deficits listed in the problem list box on initial evaluation, provide pt/family education and to maximize pt's level of independence in the home and community environment.     Pt's spiritual, cultural and educational needs considered and pt agreeable to plan of care and goals.     Anticipated barriers to physical therapy: chronic nature of symptoms; LLE radiculopathy is extremely reactive     Goals:   Short Term  "Goals (3 Weeks):   1. Pt will be compliant with HEP to supplement PT in decreasing pain with functional mobility. In progress 7/31/2020    2. Pt will perform lumbar stabilization progression of exercises and report centralization of LLE radicular symptoms.  In progress 7/31/2020      3. Pt will improve lumbar ROM by 25% in all planes to improve functional mobility.  In progress 7/31/2020    4. Pt will improve impaired LE and core MMTs to >/=4+/5 to improve strength for functional tasks. In progress 7/31/2020    Long Term Goals (6 Weeks):  1. Pt will improve FOTO score to </= 50% limited to decrease perceived limitation with maintaining/changing body position  2. Pt will improve lumbar AROM to WNL to improve functional mobility.   3. Pt will perform and hold TA contraction for 10x10" in dynamic standing activities to demonstrate improved core strength  4. Pt will improve impaired LE MMTs to >/=5/5 to improve strength for functional tasks.  5. Pt will report pain </= 4/10 during lifting activities to promote functional mobility.       Plan     Continue per POC, progressing as appropriate.       Esther Dao, PTA .    "

## 2020-08-03 ENCOUNTER — CLINICAL SUPPORT (OUTPATIENT)
Dept: REHABILITATION | Facility: HOSPITAL | Age: 55
End: 2020-08-03
Payer: MEDICAID

## 2020-08-03 DIAGNOSIS — M53.86 DECREASED RANGE OF MOTION OF LUMBAR SPINE: ICD-10-CM

## 2020-08-03 DIAGNOSIS — M54.2 PAINFUL CERVICAL RANGE OF MOTION: ICD-10-CM

## 2020-08-03 DIAGNOSIS — M54.16 LEFT LUMBAR RADICULOPATHY: ICD-10-CM

## 2020-08-03 DIAGNOSIS — R29.3 POSTURE IMBALANCE: ICD-10-CM

## 2020-08-03 DIAGNOSIS — G89.29 CHRONIC NECK PAIN: ICD-10-CM

## 2020-08-03 DIAGNOSIS — R29.898 LEFT LEG WEAKNESS: ICD-10-CM

## 2020-08-03 DIAGNOSIS — M54.2 CHRONIC NECK PAIN: ICD-10-CM

## 2020-08-03 PROCEDURE — 97110 THERAPEUTIC EXERCISES: CPT | Mod: PN

## 2020-08-03 PROCEDURE — 97140 MANUAL THERAPY 1/> REGIONS: CPT | Mod: PN

## 2020-08-03 NOTE — PROGRESS NOTES
Physical Therapy Treatment Note     Name: Gianna Corrales Tom  Clinic Number: [id number]    Therapy Diagnosis:   Encounter Diagnoses   Name Primary?    Chronic neck pain     Posture imbalance     Painful cervical range of motion     Left lumbar radiculopathy     Decreased range of motion of lumbar spine     Left leg weakness      Physician: Karlie Isaac NP    Visit Date: 8/3/2020    Physician Orders: PT Eval and Treat   Medical Diagnosis from Referral: Cervicalgia and Low back pain  Evaluation Date: 7/6/2020  Authorization Period Expiration: 12/31/2020  Plan of Care Expiration: 8/14/2020  Visit # / Visits authorized: 8/ 12 (visit 8)    Time In: 9:15 AM  Time Out: 10:00 AM  Total Billable Time: 45 min  Precautions: Standard    Subjective     Pt reports: just a little burning today in the L.  Reports in the last couple days she has noticed a knot in her R gastroc. The neck pain comes and goes.   She was compliant with home exercise program.  Response to previous treatment: improved pain (also taking gabapentin)  Functional change: Was able to go on boat yesterday and isn't in so much pain    Pain: 2/10 in Legs (burning)  0/10 in cervical spine  Location:  Burning pain in left buttock    Objective     Billie received therapeutic exercises to develop strength, endurance, ROM, flexibility, posture and core stabilization for 30 minutes including:  Prone on elbows 5 x10 sec  Prone hip flexor/quad stretch with strap 3.x.30 sec  Prone hip extension 2x10  Seated hamstring stretch 3 x 30 sec  +R gastroc stretch 3x30   seated chin tucks with towel 1 x 10  shoulder retro rolls 2 x 10  Brueggers with RTB 2 x 10 hold 5 sec  UT and levator stretches 3 x 15 sec ea  Seated alt shoulder flexion seated on PB 2# 2x10 (maintaining lumbar extension)  +Seated shoulder rows on PB with RTB 2 x 10    Not performed:  R Seated piriformis stretch 3 x 30 sec  Seated lumbar extension mobility on blue PB 2 x 10 **Pt began having burning  pain in left buttock with lumbar flexion mobility at end of reps.   Prone glute sets x30  Prone ball squeeze/glute set 2 x 10      Future visits:  +Quadruped cat  +Quadruped hip extension      Not performed:  SUPINE HOOKLYING ON TABLE:  Diaphragmatic breathing concomitant with MHP to LB   TrA sets with 5 second holds (HEP 7/10/2020) 2x10  Bridging with legs supported on peanut 2x10 (cues to perform lift on enhalation)   Ballsqueezes minimal force with 5 second holds with TrA setting (HEP 7/10/2020) x20  Alternating hip abd in hooklying with GTB and trA setting (HEP 7/10/2020) 2 x 10  NP  SEATED IN CHAIR:  Seated pelvic mobility (flexion/extension/neutral) x 5 min  Seated PB walk-out 10 x with 10 sec stretch    ADD in future:  Standing lumbar extension with arms on wall x 10 reps  PB iso abdominals  Abdominal marching    Manual Therapy:  Following informed written consent pt received Integrative Dry Needling as follows for 15 min:  Bilateral L5 paraspinals and Left SI joint with .39y66ty needles and IMS applied at 2.0mA x 5 min  Left gluteus medius and piriformis TP's with .82e770dc needles with IMS applied at 2.0mA x 5 min  Bilateral C5-7 with .35y8yrb needles with IMS applied at 2.0mA x 5min  Bilateral UT with .43w92ke needles with visible muscle twitch response L UT.      Pt had no adverse reaction to needling session and voiced understanding of post-needling instructions.     Home Exercises Provided and Patient Education Provided     Education provided:   - Continue HEP  -Post needling instructions    Written Home Exercises Provided: continue HEP.  Exercises were reviewed and Billie was able to demonstrate them prior to the end of the session.  Billie demonstrated good  understanding of the education provided.     See EMR under Patient Instructions for exercises provided 7/27/2020.    Assessment   Good tolerance to exercises and needling. Improving pain symptoms  Billie is progressing well towards her goals.   Pt  "prognosis is Fair.     Pt will continue to benefit from skilled outpatient physical therapy to address the deficits listed in the problem list box on initial evaluation, provide pt/family education and to maximize pt's level of independence in the home and community environment.     Pt's spiritual, cultural and educational needs considered and pt agreeable to plan of care and goals.     Anticipated barriers to physical therapy: chronic nature of symptoms; LLE radiculopathy is extremely reactive     Goals:   Short Term Goals (3 Weeks):   1. Pt will be compliant with HEP to supplement PT in decreasing pain with functional mobility. In progress 8/3/2020    2. Pt will perform lumbar stabilization progression of exercises and report centralization of LLE radicular symptoms.  In progress 8/3/2020      3. Pt will improve lumbar ROM by 25% in all planes to improve functional mobility.  In progress 8/3/2020    4. Pt will improve impaired LE and core MMTs to >/=4+/5 to improve strength for functional tasks. In progress 8/3/2020    Long Term Goals (6 Weeks):  1. Pt will improve FOTO score to </= 50% limited to decrease perceived limitation with maintaining/changing body position  2. Pt will improve lumbar AROM to WNL to improve functional mobility.   3. Pt will perform and hold TA contraction for 10x10" in dynamic standing activities to demonstrate improved core strength  4. Pt will improve impaired LE MMTs to >/=5/5 to improve strength for functional tasks.  5. Pt will report pain </= 4/10 during lifting activities to promote functional mobility.       Plan     Continue per POC, progressing as appropriate.       Belinda Aguayo, PT .    "

## 2020-08-07 ENCOUNTER — CLINICAL SUPPORT (OUTPATIENT)
Dept: REHABILITATION | Facility: HOSPITAL | Age: 55
End: 2020-08-07
Payer: MEDICAID

## 2020-08-07 DIAGNOSIS — M53.86 DECREASED RANGE OF MOTION OF LUMBAR SPINE: ICD-10-CM

## 2020-08-07 DIAGNOSIS — M54.16 LEFT LUMBAR RADICULOPATHY: ICD-10-CM

## 2020-08-07 DIAGNOSIS — R29.898 LEFT LEG WEAKNESS: ICD-10-CM

## 2020-08-07 DIAGNOSIS — R29.3 POSTURE IMBALANCE: ICD-10-CM

## 2020-08-07 DIAGNOSIS — G89.29 CHRONIC NECK PAIN: ICD-10-CM

## 2020-08-07 DIAGNOSIS — M54.2 PAINFUL CERVICAL RANGE OF MOTION: ICD-10-CM

## 2020-08-07 DIAGNOSIS — M54.2 CHRONIC NECK PAIN: ICD-10-CM

## 2020-08-07 PROCEDURE — 97140 MANUAL THERAPY 1/> REGIONS: CPT | Mod: PN

## 2020-08-07 PROCEDURE — 97110 THERAPEUTIC EXERCISES: CPT | Mod: PN

## 2020-08-07 NOTE — PROGRESS NOTES
"  Physical Therapy Treatment Note     Name: Gianna Corrales Tom  Clinic Number: [id number]    Therapy Diagnosis:   Encounter Diagnoses   Name Primary?    Chronic neck pain     Posture imbalance     Painful cervical range of motion     Left lumbar radiculopathy     Decreased range of motion of lumbar spine     Left leg weakness      Physician: Karlie Isaac NP    Visit Date: 8/7/2020    Physician Orders: PT Eval and Treat   Medical Diagnosis from Referral: Cervicalgia and Low back pain  Evaluation Date: 7/6/2020  Authorization Period Expiration: 12/31/2020  Plan of Care Expiration: 8/14/2020  Visit # / Visits authorized: 9/ 12 (visit 10)    Time In: 9:15 AM  Time Out: 9:55 AM  Total Billable Time: 40 min  Precautions: Standard    Subjective     Pt reports: Significant improvement in burning pain in L buttock and LE. Does have some mild aching at right sacrum today. Also continued "knot" at posterior right knee.  Overall significant improvement. 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   She was compliant with home exercise program.  Response to previous treatment: improved pain (also taking gabapentin)  Functional change: Able to walk without limp on LLE    Pain: 0/10 in Left Leg/ buttock: 1/10 at right sacrum today  0/10 in cervical spine  Location:  Ache right sacrum    Objective     Manual Therapy 10 min:  Pt with noted anterior innominate rotation R. +supine to long sit test R. Received MET to correct. 3 min  IASTM to right gastroc and posterior and lateral knee. 8 min    Billie received therapeutic exercises to develop strength, endurance, ROM, flexibility, posture and core stabilization for 30 minutes including:  TABLE:  Prone on elbows 5 x10 sec  Prone hip flexor/quad stretch with strap 3.x.30 sec  Prone hip extension 2x10  Prone ball squeeze/glute set 2 x 10    SEATED:  Seated hamstring stretch 3 x 30 sec  R Seated piriformis stretch 3 x 30 sec  seated chin tucks with towel 1 " x 10  shoulder retro rolls 2 x 10  Brueggers with RTB 2 x 10 hold 5 sec  UT and levator stretches 3 x 15 sec ea      Not performed:  Seated alt shoulder flexion seated on PB 2# 2x10 (maintaining lumbar extension)  Seated shoulder rows on PB with RTB 2 x 10  R gastroc stretch 3x30   Seated lumbar extension mobility on blue PB 2 x 10 **Pt began having burning pain in left buttock with lumbar flexion mobility at end of reps.   Prone glute sets x30    Future visits:  +Quadruped cat  +Quadruped hip extension      Not performed:  SUPINE HOOKLYING ON TABLE:  Diaphragmatic breathing concomitant with MHP to LB   TrA sets with 5 second holds (HEP 7/10/2020) 2x10  Bridging with legs supported on peanut 2x10 (cues to perform lift on enhalation)   Ballsqueezes minimal force with 5 second holds with TrA setting (HEP 7/10/2020) x20  Alternating hip abd in hooklying with GTB and trA setting (HEP 7/10/2020) 2 x 10  NP  SEATED IN CHAIR:  Seated pelvic mobility (flexion/extension/neutral) x 5 min  Seated PB walk-out 10 x with 10 sec stretch    ADD in future:  Standing lumbar extension with arms on wall x 10 reps  PB iso abdominals  Abdominal marching    Manual Therapy:  Following informed written consent pt received Integrative Dry Needling as follows for 0 min:  Bilateral L5 paraspinals and Left SI joint with .36i63wx needles and IMS applied at 2.0mA x 5 min  Left gluteus medius and piriformis TP's with .91m479ce needles with IMS applied at 2.0mA x 5 min  Bilateral C5-7 with .70u1ruy needles with IMS applied at 2.0mA x 5min  Bilateral UT with .07j44ow needles with visible muscle twitch response L UT.      Pt had no adverse reaction to needling session and voiced understanding of post-needling instructions.     Home Exercises Provided and Patient Education Provided     Education provided:   - Continue HEP  -Post needling instructions    Written Home Exercises Provided: continue HEP.  Exercises were reviewed and Billie was able to  "demonstrate them prior to the end of the session.  Billie demonstrated good  understanding of the education provided.     See EMR under Patient Instructions for exercises provided 7/27/2020.    Assessment   Mild right innominate rotation corrected with MET Significant improvement in pelvic mobility with improved gait pattern.   Billie is progressing well towards her goals.   Pt prognosis is Fair.     Pt will continue to benefit from skilled outpatient physical therapy to address the deficits listed in the problem list box on initial evaluation, provide pt/family education and to maximize pt's level of independence in the home and community environment.     Pt's spiritual, cultural and educational needs considered and pt agreeable to plan of care and goals.     Anticipated barriers to physical therapy: chronic nature of symptoms; LLE radiculopathy is extremely reactive     Goals:   Short Term Goals (3 Weeks):   1. Pt will be compliant with HEP to supplement PT in decreasing pain with functional mobility. In progress 8/7/2020    2. Pt will perform lumbar stabilization progression of exercises and report centralization of LLE radicular symptoms.  In progress 8/7/2020      3. Pt will improve lumbar ROM by 25% in all planes to improve functional mobility.  In progress 8/7/2020    4. Pt will improve impaired LE and core MMTs to >/=4+/5 to improve strength for functional tasks. In progress 8/7/2020    Long Term Goals (6 Weeks):  1. Pt will improve FOTO score to </= 50% limited to decrease perceived limitation with maintaining/changing body position  2. Pt will improve lumbar AROM to WNL to improve functional mobility.   3. Pt will perform and hold TA contraction for 10x10" in dynamic standing activities to demonstrate improved core strength  4. Pt will improve impaired LE MMTs to >/=5/5 to improve strength for functional tasks.  5. Pt will report pain </= 4/10 during lifting activities to promote functional mobility.       Plan "     Continue per POC, progressing as appropriate. Re-assess and update POC next visit.       Belinda Aguayo, PT .

## 2020-08-10 ENCOUNTER — CLINICAL SUPPORT (OUTPATIENT)
Dept: REHABILITATION | Facility: HOSPITAL | Age: 55
End: 2020-08-10
Payer: MEDICAID

## 2020-08-10 DIAGNOSIS — M54.16 LEFT LUMBAR RADICULOPATHY: ICD-10-CM

## 2020-08-10 DIAGNOSIS — R29.898 LEFT LEG WEAKNESS: ICD-10-CM

## 2020-08-10 DIAGNOSIS — G89.29 CHRONIC NECK PAIN: ICD-10-CM

## 2020-08-10 DIAGNOSIS — M54.2 PAINFUL CERVICAL RANGE OF MOTION: ICD-10-CM

## 2020-08-10 DIAGNOSIS — M54.2 CHRONIC NECK PAIN: ICD-10-CM

## 2020-08-10 DIAGNOSIS — R29.3 POSTURE IMBALANCE: ICD-10-CM

## 2020-08-10 DIAGNOSIS — M53.86 DECREASED RANGE OF MOTION OF LUMBAR SPINE: ICD-10-CM

## 2020-08-10 PROCEDURE — 97110 THERAPEUTIC EXERCISES: CPT | Mod: PN

## 2020-08-10 PROCEDURE — 97140 MANUAL THERAPY 1/> REGIONS: CPT | Mod: PN

## 2020-08-10 NOTE — PROGRESS NOTES
Physical Therapy Treatment Note     Name: Gianna Corrales Tom  Clinic Number: [id number]    Therapy Diagnosis:   Encounter Diagnoses   Name Primary?    Chronic neck pain     Posture imbalance     Painful cervical range of motion     Left lumbar radiculopathy     Decreased range of motion of lumbar spine     Left leg weakness      Physician: Karlie Isaac NP    Visit Date: 8/10/2020    Physician Orders: PT Eval and Treat   Medical Diagnosis from Referral: Cervicalgia and Low back pain  Evaluation Date: 7/6/2020  Authorization Period Expiration: 12/31/2020  Plan of Care Expiration: 8/14/2020  Visit # / Visits authorized: 10/ 12 (visit 11)    Time In: 9:18 AM  Time Out: 10:02 AM  Total Billable Time: 45 min  Precautions: Standard    Subjective     Pt reports: Significant improvement in burning pain in L buttock and LE.  She was compliant with home exercise program.  Response to previous treatment: improved pain (also taking gabapentin)  Functional change: Able to walk without limp on LLE    Pain: 0/10 in Left Leg/ buttock: 1/10 at right sacrum today  0/10 in cervical spine  Location:  Ache right sacrum    Objective       Billie received therapeutic exercises to develop strength, endurance, ROM, flexibility, posture and core stabilization for 30 minutes including:  TABLE:  Prone on elbows 5 x10 sec  Prone hip flexor/quad stretch with strap 3.x.30 sec  Prone hip extension 2x10  Prone ball squeeze/glute set 2 x 10    SEATED:  Seated hamstring stretch 3 x 30 sec  R Seated piriformis stretch 3 x 30 sec  seated chin tucks with towel 1 x 10  shoulder retro rolls 2 x 10  Brueggers with RTB 2 x 10 hold 5 sec  UT and levator stretches 3 x 15 sec ea        Manual Therapy:  Following informed written consent pt received Integrative Dry Needling as follows for 15 min:  Bilateral L5 paraspinals and Left SI joint with .[id number] needles and IMS applied at 2.0mA x 5 min  Left gluteus medius  TP's with .[id number] needles with  IMS applied at 2.0mA x 5 min  Bilateral C5-7 with .[id number] needles with IMS applied at 2.0mA x 5min  Bilateral UT with .[id number] needles with visible muscle twitch response L UT.      Pt had no adverse reaction to needling session and voiced understanding of post-needling instructions.    Not performed:  Seated alt shoulder flexion seated on PB 2# 2x10 (maintaining lumbar extension)  Seated shoulder rows on PB with RTB 2 x 10  R gastroc stretch 3x30   Seated lumbar extension mobility on blue PB 2 x 10 **Pt began having burning pain in left buttock with lumbar flexion mobility at end of reps.   Prone glute sets x30    Future visits:  +Quadruped cat  +Quadruped hip extension      Not performed:  SUPINE HOOKLYING ON TABLE:  Diaphragmatic breathing concomitant with MHP to LB   TrA sets with 5 second holds (HEP 7/10/2020) 2x10  Bridging with legs supported on peanut 2x10 (cues to perform lift on enhalation)   Ballsqueezes minimal force with 5 second holds with TrA setting (HEP 7/10/2020) x20  Alternating hip abd in hooklying with GTB and trA setting (HEP 7/10/2020) 2 x 10  NP  SEATED IN CHAIR:  Seated pelvic mobility (flexion/extension/neutral) x 5 min  Seated PB walk-out 10 x with 10 sec stretch    ADD in future:  Standing lumbar extension with arms on wall x 10 reps  PB iso abdominals  Abdominal marching       Home Exercises Provided and Patient Education Provided     Education provided:   - Continue HEP-importance of consistent HEP  -Post needling instructions    Written Home Exercises Provided: continue HEP.  Exercises were reviewed and Billie was able to demonstrate them prior to the end of the session.  Billie demonstrated good  understanding of the education provided.     See EMR under Patient Instructions for exercises provided 7/27/2020.    Assessment   Pt with significant pain relief and improving postural stability.   Billie is progressing well towards her goals.   Pt prognosis is Good.     Pt will continue to benefit  "from skilled outpatient physical therapy to address the deficits listed in the problem list box on initial evaluation, provide pt/family education and to maximize pt's level of independence in the home and community environment.     Pt's spiritual, cultural and educational needs considered and pt agreeable to plan of care and goals.     Anticipated barriers to physical therapy: chronic nature of symptoms; LLE radiculopathy is extremely reactive     Goals:   Short Term Goals (3 Weeks):   1. Pt will be compliant with HEP to supplement PT in decreasing pain with functional mobility. In progress 8/10/2020    2. Pt will perform lumbar stabilization progression of exercises and report centralization of LLE radicular symptoms.  In progress 8/10/2020      3. Pt will improve lumbar ROM by 25% in all planes to improve functional mobility.  In progress 8/10/2020    4. Pt will improve impaired LE and core MMTs to >/=4+/5 to improve strength for functional tasks. In progress 8/10/2020    Long Term Goals (6 Weeks):  1. Pt will improve FOTO score to </= 50% limited to decrease perceived limitation with maintaining/changing body position  2. Pt will improve lumbar AROM to WNL to improve functional mobility.   3. Pt will perform and hold TA contraction for 10x10" in dynamic standing activities to demonstrate improved core strength  4. Pt will improve impaired LE MMTs to >/=5/5 to improve strength for functional tasks.  5. Pt will report pain </= 4/10 during lifting activities to promote functional mobility.       Plan     Continue per POC, progressing as appropriate. Re-assess in 2 weeks - possible discharge if symptoms remain improved       Belinda Aguayo, PT .    "

## 2020-08-24 ENCOUNTER — CLINICAL SUPPORT (OUTPATIENT)
Dept: REHABILITATION | Facility: HOSPITAL | Age: 55
End: 2020-08-24
Payer: MEDICAID

## 2020-08-24 DIAGNOSIS — M53.86 DECREASED RANGE OF MOTION OF LUMBAR SPINE: ICD-10-CM

## 2020-08-24 DIAGNOSIS — R29.3 POSTURE IMBALANCE: ICD-10-CM

## 2020-08-24 DIAGNOSIS — M54.2 PAINFUL CERVICAL RANGE OF MOTION: ICD-10-CM

## 2020-08-24 DIAGNOSIS — R29.898 LEFT LEG WEAKNESS: ICD-10-CM

## 2020-08-24 DIAGNOSIS — M54.2 CHRONIC NECK PAIN: ICD-10-CM

## 2020-08-24 DIAGNOSIS — M54.16 LEFT LUMBAR RADICULOPATHY: ICD-10-CM

## 2020-08-24 DIAGNOSIS — G89.29 CHRONIC NECK PAIN: ICD-10-CM

## 2020-08-24 PROCEDURE — 97110 THERAPEUTIC EXERCISES: CPT | Mod: PN

## 2020-08-24 NOTE — PLAN OF CARE
"  Outpatient Therapy Updated Plan of Care     Visit Date: 8/24/2020    Name: Gianna Santos  Clinic Number: [id number]    Therapy Diagnosis:   Encounter Diagnoses   Name Primary?    Chronic neck pain     Posture imbalance     Painful cervical range of motion     Left lumbar radiculopathy     Decreased range of motion of lumbar spine     Left leg weakness      Physician: Karlie Isaac NP    Physician Orders: PT Eval and Treat   Medical Diagnosis from Referral: Cervicalgia and Low back pain  Evaluation Date: 7/6/2020  Authorization Period Expiration: 12/31/2020  Plan of Care Expiration: 8/14/2020  Visit # / Visits authorized: 10/ 12 (visit 11)  Precautions: Standard  Functional Level Prior to Evaluation:   10 year history of limited function due to LBP    Subjective     Update: Overall pt reports significant improvement in LBP with LLE radicular symptoms stating pain is a 1/10. She states she has been able to manage symptoms with HEP. She does report recent episode of right LE  Pain behind the knee - "feels like a knot").  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     Objective     Update: Posture: Forward head with Dowager's hump  Palpation: Tender C5-7 TP   Sensation: Grossly intact to light touch  DTRs: +2 biceps and triceps     Range of Motion/Strength:   CERVICAL AROM 8/24/2020 AROM  7/27/2020 Pain/Dysfunction with Movement   Flexion 35 30 Pull post spine   Extension 50 40    Right side bending 30 30     Left side bending 45 30     Right rotation 70 70 Ipsilateral cervical pain   Left rotation 75 70        Shoulder AROM/strength:   BUE AROM is WNL  Strength of BUE's is 4/5-4+/5 shoulders and 4+/5 - 5/5 elbows and wrists  Lumbar Range of Motion:     7/6/2020 8/24/2020 Pain   Flexion 40    40 Feels good         Extension 30    " 30          Left Side Bending 15 25          Right Side Bending 20 25           Left rotation    75% 75%           Right Rotation    75% 75%                 Lower Extremity Strength  Right LE   Left LE     Knee extension: 5/5 Knee extension: 4=/5   Knee flexion: 5/5 Knee flexion: 4+/5   Hip flexion: 4+/5 Hip flexion: 4+/5   Hip extension:  4/5 Hip extension: 4/5   Hip abduction: 4/5 Hip abduction: 4/5   Hip adduction: 4+/5 Hip adduction 4/5   Ankle dorsiflexion: 5/5 Ankle dorsiflexion: 4+/5   Ankle plantarflexion: 5/5 Ankle plantarflexion: 4/5            Special Tests:  -Repeated Flexion: Relieving  -Repeated Ext: mild left buttock pain  -Piriformis Test: - left  - Seated dural stretch test - left      Assessment     Update: Patient is demonstrating overall improvement in cervical AROM with limitation in right side bending and rotation. She continues to have difficulty stabilizing thoracic spine with BUE reaching and prolonged static holding of UE's as in cutting hair. Her lumbar AROM is now not provocative of LLE radicular symptoms and core strength is in 4/5 to 4+/5 range.   Patient would benefit from continuing PT for 3 weeks with focus on upper back/shoulder strengthening to improve postural stability and ability to hold arms at shoulder height for work activity.     Short Term Goals (3 Weeks):   1. Pt will be compliant with HEP to supplement PT in decreasing pain with functional mobility. In progress Met 8/24/2020     2. Pt will perform lumbar stabilization progression of exercises and report centralization of LLE radicular symptoms.  Met 8/24/2020        3. Pt will improve lumbar ROM by 25% in all planes to improve functional mobility.  In progress 8/24/2020     4. Pt will improve impaired LE and core MMTs to >/=4+/5 to improve strength for functional tasks. In progress 8/24/2020       *5. Pt will demonstrated improved cervical AROM by 5 deg all planes to improve functional mobility of neck.  In progress  "8/24/2020    *6. Pt will progress postural stability ex program without exacerbation of radicular symptoms.  Met 8/24/2020     Long Term Goals (6 Weeks):  1. Pt will improve FOTO score to </= 50% limited to decrease perceived limitation with maintaining/changing body position  2. Pt will improve lumbar AROM to WNL to improve functional mobility.  Met 8/24/2020  3. Pt will perform and hold TA contraction for 10x10" in dynamic standing activities to demonstrate improved core strength Met 8/24/2020  4. Pt will improve impaired LE MMTs to >/=5/5 to improve strength for functional tasks In progress 8/24/2020  .  5. Pt will report pain </= 4/10 during lifting activities to promote functional mobility. In progress 8/24/2020    *6. 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 In progress 8/24/2020    Reasons for Recertification of Therapy:     -Postural weakness limits functional activity of upper trunk/ UE's such as lifting and holding arms in sustained positions required to cut hair.  -Cervical AROM limitations prevents full mobility when driving.      Plan     Updated Certification Period: 8/24/2020 to 10/2/2020.  Recommended Treatment Plan: 2 times per week for 3 weeks: Therapeutic exercise with emphasis on strength progression, manual therapy including dry needling to cervical trigger points.      Belinda Aguayo, PT  8/24/2020      I CERTIFY THE NEED FOR THESE SERVICES FURNISHED UNDER THIS PLAN OF TREATMENT AND WHILE UNDER MY CARE    Physician's comments:        Physician's Signature: ___________________________________________________    "

## 2020-08-24 NOTE — PROGRESS NOTES
"  Physical Therapy Treatment Note     Name: Gianna Corrales Tom  Clinic Number: [id number]    Therapy Diagnosis:   Encounter Diagnoses   Name Primary?    Chronic neck pain     Posture imbalance     Painful cervical range of motion     Left lumbar radiculopathy     Decreased range of motion of lumbar spine     Left leg weakness      Physician: Karlie Isaac NP    Visit Date: 8/24/2020    Physician Orders: PT Eval and Treat   Medical Diagnosis from Referral: Cervicalgia and Low back pain  Evaluation Date: 7/6/2020  Authorization Period Expiration: 12/31/2020  Plan of Care Expiration: 8/14/2020  Visit # / Visits authorized: 11/ 12 (visit 12)    Time In: 9:15 AM  Time Out: 9:55 AM  Total Billable Time: 40 min  Precautions: Standard    Subjective     Pt reports: Significant improvement in burning pain in L buttock and LE symptoms; has been doing ex consistently. Working more - 5 days/week so having some neck pain. Had an episode of RLE muscle pain - describes pain and a "knot" behind right knee  She was compliant with home exercise program.  Response to previous treatment: improved pain (also taking gabapentin)  Functional change: Able to walk without limp on LLE; working full time now. 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     Pain: 0/10 in Left Leg/ buttock: 0/10 at right sacrum today  3/10 in cervical spine  Location:  Bilateral mid to lower c-spine    Objective       Therapeutic activities 10 min: Re-assessment - see updated POC    Billie received therapeutic exercises to develop strength, endurance, ROM, flexibility, posture and core stabilization for 20 minutes including:      SEATED:  Seated hamstring stretch 3 x 30 sec  R Seated piriformis stretch 3 x 30 sec  seated chin tucks with towel 1 x 10  shoulder retro rolls 2 x 10  Brueggers with RTB 2 x 10 hold 5 sec  UT and levator stretches 3 x 15 sec ea         Manual Therapy:  Following informed written consent pt received Integrative Dry " Needling as follows for 10 min:  Bilateral C5-7 with .[id number] needles with IMS applied at 2.0mA x 5min  Bilateral UT with .[id number] needles with visible muscle twitch response L UT.      Pt had no adverse reaction to needling session and voiced understanding of post-needling instructions.    Not performed:  TABLE:  Prone on elbows 5 x10 sec  Prone hip flexor/quad stretch with strap 3.x.30 sec  Prone hip extension 2x10  Prone ball squeeze/glute set 2 x 10  Seated alt shoulder flexion seated on PB 2# 2x10 (maintaining lumbar extension)  Seated shoulder rows on PB with RTB 2 x 10  R gastroc stretch 3x30   Seated lumbar extension mobility on blue PB 2 x 10 **Pt began having burning pain in left buttock with lumbar flexion mobility at end of reps.   Prone glute sets x30    Future visits:  +Quadruped cat  +Quadruped hip extension      Not performed:  SUPINE HOOKLYING ON TABLE:  Diaphragmatic breathing concomitant with MHP to LB   TrA sets with 5 second holds (HEP 7/10/2020) 2x10  Bridging with legs supported on peanut 2x10 (cues to perform lift on enhalation)   Ballsqueezes minimal force with 5 second holds with TrA setting (HEP 7/10/2020) x20  Alternating hip abd in hooklying with GTB and trA setting (HEP 7/10/2020) 2 x 10  NP  SEATED IN CHAIR:  Seated pelvic mobility (flexion/extension/neutral) x 5 min  Seated PB walk-out 10 x with 10 sec stretch    ADD in future:  Standing lumbar extension with arms on wall x 10 reps  PB iso abdominals  Abdominal marching       Home Exercises Provided and Patient Education Provided     Education provided:   - Continue HEP-importance of consistent HEP  -Post needling instructions    Written Home Exercises Provided: continue HEP.  Exercises were reviewed and Billie was able to demonstrate them prior to the end of the session.  Billie demonstrated good  understanding of the education provided.     See EMR under Patient Instructions for exercises provided 7/27/2020.    Assessment   Pt with  "significant pain relief and improving postural stability. Continues to have limited cervical rotation and side bending but improved. 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   See updated POC    Billie is progressing well towards her goals.   Pt prognosis is Good.     Pt will continue to benefit from skilled outpatient physical therapy to address the deficits listed in the problem list box on initial evaluation, provide pt/family education and to maximize pt's level of independence in the home and community environment.     Pt's spiritual, cultural and educational needs considered and pt agreeable to plan of care and goals.     Anticipated barriers to physical therapy: chronic nature of symptoms; LLE radiculopathy is extremely reactive     Goals:   Short Term Goals (3 Weeks):   1. Pt will be compliant with HEP to supplement PT in decreasing pain with functional mobility. In progress Met 8/24/2020     2. Pt will perform lumbar stabilization progression of exercises and report centralization of LLE radicular symptoms.  Met 8/24/2020        3. Pt will improve lumbar ROM by 25% in all planes to improve functional mobility.  In progress 8/24/2020     4. Pt will improve impaired LE and core MMTs to >/=4+/5 to improve strength for functional tasks. In progress 8/24/2020        *5. Pt will demonstrated improved cervical AROM by 5 deg all planes to improve functional mobility of neck.  In progress 8/24/2020     *6. Pt will progress postural stability ex program without exacerbation of radicular symptoms.  Met 8/24/2020     Long Term Goals (6 Weeks):  1. Pt will improve FOTO score to </= 50% limited to decrease perceived limitation with maintaining/changing body position  2. Pt will improve lumbar AROM to WNL to improve functional mobility.  Met 8/24/2020  3. Pt will perform and hold TA contraction for 10x10" in dynamic standing activities to demonstrate " improved core strength Met 8/24/2020  4. Pt will improve impaired LE MMTs to >/=5/5 to improve strength for functional tasks In progress 8/24/2020  .  5. Pt will report pain </= 4/10 during lifting activities to promote functional mobility. In progress 8/24/2020     *6. 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       Belinda Aguayo, PT .

## 2020-08-25 ENCOUNTER — HOSPITAL ENCOUNTER (OUTPATIENT)
Dept: RADIOLOGY | Facility: HOSPITAL | Age: 55
Discharge: HOME | End: 2020-08-25
Attending: NURSE PRACTITIONER
Payer: MEDICAID

## 2020-08-25 DIAGNOSIS — M25.561 PAIN IN RIGHT KNEE: Primary | ICD-10-CM

## 2020-08-25 DIAGNOSIS — M25.561 PAIN IN RIGHT KNEE: ICD-10-CM

## 2020-08-25 PROCEDURE — 73560 X-RAY EXAM OF KNEE 1 OR 2: CPT | Mod: TC,PO,RT

## 2020-10-19 DIAGNOSIS — M46.96 INFLAMMATORY SPONDYLOPATHY OF LUMBAR REGION: Primary | ICD-10-CM

## 2020-10-19 DIAGNOSIS — M51.36 DEGENERATION OF LUMBAR INTERVERTEBRAL DISC: ICD-10-CM

## 2020-10-22 ENCOUNTER — HOSPITAL ENCOUNTER (OUTPATIENT)
Dept: RADIOLOGY | Facility: HOSPITAL | Age: 55
Discharge: HOME | End: 2020-10-22
Attending: NURSE PRACTITIONER
Payer: MEDICAID

## 2020-10-22 DIAGNOSIS — M46.96 INFLAMMATORY SPONDYLOPATHY OF LUMBAR REGION: ICD-10-CM

## 2020-10-22 DIAGNOSIS — M51.36 DEGENERATION OF LUMBAR INTERVERTEBRAL DISC: ICD-10-CM

## 2020-10-22 PROCEDURE — 72148 MRI LUMBAR SPINE W/O DYE: CPT | Mod: TC,PO

## 2020-11-16 ENCOUNTER — DOCUMENTATION ONLY (OUTPATIENT)
Dept: REHABILITATION | Facility: HOSPITAL | Age: 55
End: 2020-11-16

## 2020-11-16 PROBLEM — M54.2 PAINFUL CERVICAL RANGE OF MOTION: Status: RESOLVED | Noted: 2020-07-27 | Resolved: 2020-11-16

## 2020-11-16 PROBLEM — R29.898 LEFT LEG WEAKNESS: Status: RESOLVED | Noted: 2020-07-06 | Resolved: 2020-11-16

## 2020-11-16 PROBLEM — G89.29 CHRONIC NECK PAIN: Status: RESOLVED | Noted: 2020-07-27 | Resolved: 2020-11-16

## 2020-11-16 PROBLEM — M54.2 CHRONIC NECK PAIN: Status: RESOLVED | Noted: 2020-07-27 | Resolved: 2020-11-16

## 2020-11-16 PROBLEM — R29.3 POSTURE IMBALANCE: Status: RESOLVED | Noted: 2020-07-27 | Resolved: 2020-11-16

## 2020-11-16 PROBLEM — M54.16 LEFT LUMBAR RADICULOPATHY: Status: RESOLVED | Noted: 2020-07-06 | Resolved: 2020-11-16

## 2020-11-16 PROBLEM — M53.86 DECREASED RANGE OF MOTION OF LUMBAR SPINE: Status: RESOLVED | Noted: 2020-07-06 | Resolved: 2020-11-16

## 2020-11-16 NOTE — PROGRESS NOTES
PHYSICAL THERAPY DISCHARGE:    This patient was originally evaluated at our facility on: 7/6/2020         Pt attended PT for a total of 11 Visits receiving: Manual Therapy, Therex, Postural Education, Body Mechanics Training, Home Exercise Instruction     This patient's last visit occurred on: 8/24/2020      Short term goals were achieved: 3 of 6 met    Long term goals were achieved: 3 of 5 met    Pt was DC'd from our care secondary to: insurance did not approve continued PT       Therapist: Belinda Aguayo, PT  11/16/2020

## 2021-02-24 ENCOUNTER — HOSPITAL ENCOUNTER (OUTPATIENT)
Dept: RADIOLOGY | Facility: HOSPITAL | Age: 56
Discharge: HOME | End: 2021-02-24
Attending: NEUROLOGICAL SURGERY
Payer: MEDICAID

## 2021-02-24 DIAGNOSIS — M48.062 SPINAL STENOSIS, LUMBAR REGION WITH NEUROGENIC CLAUDICATION: ICD-10-CM

## 2021-02-24 DIAGNOSIS — M48.062 SPINAL STENOSIS, LUMBAR REGION WITH NEUROGENIC CLAUDICATION: Primary | ICD-10-CM

## 2021-02-24 PROCEDURE — 72120 X-RAY BEND ONLY L-S SPINE: CPT | Mod: TC,PO

## 2021-03-19 DIAGNOSIS — R59.0 LOCALIZED ENLARGED LYMPH NODES: Primary | ICD-10-CM

## 2021-03-30 ENCOUNTER — HOSPITAL ENCOUNTER (OUTPATIENT)
Dept: RADIOLOGY | Facility: HOSPITAL | Age: 56
Discharge: HOME | End: 2021-03-30
Attending: NURSE PRACTITIONER
Payer: MEDICAID

## 2021-03-30 DIAGNOSIS — R59.0 LOCALIZED ENLARGED LYMPH NODES: ICD-10-CM

## 2021-03-30 PROCEDURE — 76536 US EXAM OF HEAD AND NECK: CPT | Mod: TC,PO

## 2021-07-08 DIAGNOSIS — Z12.31 ENCOUNTER FOR SCREENING MAMMOGRAM FOR MALIGNANT NEOPLASM OF BREAST: Primary | ICD-10-CM

## 2021-07-19 ENCOUNTER — HOSPITAL ENCOUNTER (OUTPATIENT)
Dept: RADIOLOGY | Facility: HOSPITAL | Age: 56
Discharge: HOME | End: 2021-07-19
Attending: OBSTETRICS & GYNECOLOGY
Payer: MEDICAID

## 2021-07-19 DIAGNOSIS — Z12.31 ENCOUNTER FOR SCREENING MAMMOGRAM FOR MALIGNANT NEOPLASM OF BREAST: ICD-10-CM

## 2021-07-19 PROCEDURE — 77067 SCR MAMMO BI INCL CAD: CPT | Mod: TC,PO

## 2021-10-26 ENCOUNTER — HOSPITAL ENCOUNTER (OUTPATIENT)
Dept: RADIOLOGY | Facility: HOSPITAL | Age: 56
Discharge: HOME | End: 2021-10-26
Attending: OBSTETRICS & GYNECOLOGY
Payer: MEDICAID

## 2021-10-26 DIAGNOSIS — Z78.0 MENOPAUSE: ICD-10-CM

## 2021-10-26 PROCEDURE — 77080 DXA BONE DENSITY AXIAL: CPT | Mod: TC,PO

## 2022-03-15 DIAGNOSIS — M25.511 RIGHT SHOULDER PAIN: Primary | ICD-10-CM

## 2022-03-21 ENCOUNTER — HOSPITAL ENCOUNTER (OUTPATIENT)
Dept: RADIOLOGY | Facility: HOSPITAL | Age: 57
Discharge: HOME | End: 2022-03-21
Attending: NURSE PRACTITIONER
Payer: MEDICAID

## 2022-03-21 DIAGNOSIS — M25.511 RIGHT SHOULDER PAIN: ICD-10-CM

## 2022-03-21 PROCEDURE — 73030 X-RAY EXAM OF SHOULDER: CPT | Mod: TC,PO,RT

## 2022-07-08 DIAGNOSIS — Z12.31 ENCOUNTER FOR SCREENING MAMMOGRAM FOR MALIGNANT NEOPLASM OF BREAST: Primary | ICD-10-CM

## 2022-07-08 DIAGNOSIS — R22.1 LOCALIZED SWELLING, MASS AND LUMP, NECK: Primary | ICD-10-CM
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
## 2022-07-14 DIAGNOSIS — M54.16 LUMBAR RADICULOPATHY: Primary | ICD-10-CM

## 2022-07-14 DIAGNOSIS — R29.898 WEAKNESS OF LEFT LOWER EXTREMITY: ICD-10-CM

## 2022-07-14 DIAGNOSIS — Z98.890 H/O LAMINECTOMY: ICD-10-CM

## 2022-07-21 ENCOUNTER — HOSPITAL ENCOUNTER (OUTPATIENT)
Dept: RADIOLOGY | Facility: HOSPITAL | Age: 57
Discharge: HOME | End: 2022-07-21
Attending: NURSE PRACTITIONER
Payer: MEDICAID

## 2022-07-21 DIAGNOSIS — Z12.31 ENCOUNTER FOR SCREENING MAMMOGRAM FOR MALIGNANT NEOPLASM OF BREAST: ICD-10-CM

## 2022-07-21 DIAGNOSIS — R22.1 LOCALIZED SWELLING, MASS AND LUMP, NECK: ICD-10-CM

## 2022-07-21 PROCEDURE — 76536 US EXAM OF HEAD AND NECK: CPT | Mod: TC,PO

## 2022-07-21 PROCEDURE — 77063 BREAST TOMOSYNTHESIS BI: CPT | Mod: TC,PO

## 2022-07-21 PROCEDURE — 77067 SCR MAMMO BI INCL CAD: CPT | Mod: TC,PO

## 2022-08-04 ENCOUNTER — HOSPITAL ENCOUNTER (OUTPATIENT)
Dept: RADIOLOGY | Facility: HOSPITAL | Age: 57
Discharge: HOME | End: 2022-08-04
Attending: NURSE PRACTITIONER
Payer: MEDICAID

## 2022-08-04 DIAGNOSIS — M54.16 LUMBAR RADICULOPATHY: ICD-10-CM

## 2022-08-04 DIAGNOSIS — R29.898 WEAKNESS OF LEFT LOWER EXTREMITY: ICD-10-CM
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
## 2022-08-04 DIAGNOSIS — Z98.890 H/O LAMINECTOMY: ICD-10-CM

## 2022-08-04 PROCEDURE — 72148 MRI LUMBAR SPINE W/O DYE: CPT | Mod: TC,PO

## 2023-01-13 DIAGNOSIS — Z98.1 HISTORY OF FUSION OF CERVICAL SPINE: Primary | ICD-10-CM

## 2023-01-13 DIAGNOSIS — M54.12 CERVICAL RADICULOPATHY: ICD-10-CM

## 2023-01-13 DIAGNOSIS — M54.2 CERVICALGIA: ICD-10-CM

## 2023-01-26 ENCOUNTER — HOSPITAL ENCOUNTER (OUTPATIENT)
Dept: RADIOLOGY | Facility: HOSPITAL | Age: 58
Discharge: HOME | End: 2023-01-26
Attending: NURSE PRACTITIONER
Payer: MEDICAID

## 2023-01-26 DIAGNOSIS — M54.2 CERVICALGIA: ICD-10-CM

## 2023-01-26 DIAGNOSIS — Z98.1 HISTORY OF FUSION OF CERVICAL SPINE: ICD-10-CM

## 2023-01-26 DIAGNOSIS — M54.12 CERVICAL RADICULOPATHY: ICD-10-CM

## 2023-01-26 PROCEDURE — 72141 MRI NECK SPINE W/O DYE: CPT | Mod: TC,PO

## 2023-08-04 DIAGNOSIS — Z12.31 ENCOUNTER FOR SCREENING MAMMOGRAM FOR MALIGNANT NEOPLASM OF BREAST: Primary | ICD-10-CM

## 2023-08-09 ENCOUNTER — HOSPITAL ENCOUNTER (OUTPATIENT)
Dept: RADIOLOGY | Facility: HOSPITAL | Age: 58
Discharge: HOME | End: 2023-08-09
Payer: MEDICAID

## 2023-08-09 DIAGNOSIS — Z12.31 ENCOUNTER FOR SCREENING MAMMOGRAM FOR MALIGNANT NEOPLASM OF BREAST: ICD-10-CM

## 2023-08-09 PROCEDURE — 77067 SCR MAMMO BI INCL CAD: CPT | Mod: TC,PO

## 2023-08-17 DIAGNOSIS — E04.1 NONTOXIC UNINODULAR GOITER: Primary | ICD-10-CM

## 2023-08-22 ENCOUNTER — HOSPITAL ENCOUNTER (OUTPATIENT)
Dept: RADIOLOGY | Facility: HOSPITAL | Age: 58
Discharge: HOME | End: 2023-08-22
Payer: MEDICAID

## 2023-08-22 DIAGNOSIS — E04.1 NONTOXIC UNINODULAR GOITER: ICD-10-CM

## 2023-08-22 PROCEDURE — 76536 US EXAM OF HEAD AND NECK: CPT | Mod: TC,PO

## 2023-09-20 DIAGNOSIS — N94.9 PAIN OF FEMALE GENITALIA: Primary | ICD-10-CM

## 2023-10-03 ENCOUNTER — HOSPITAL ENCOUNTER (OUTPATIENT)
Dept: RADIOLOGY | Facility: HOSPITAL | Age: 58
Discharge: HOME | End: 2023-10-03
Attending: OBSTETRICS & GYNECOLOGY
Payer: MEDICAID

## 2023-10-03 DIAGNOSIS — N94.9 PAIN OF FEMALE GENITALIA: ICD-10-CM

## 2023-10-03 PROCEDURE — 76830 TRANSVAGINAL US NON-OB: CPT | Mod: TC,PO

## 2024-04-26 DIAGNOSIS — M25.512 PAIN IN LEFT SHOULDER: ICD-10-CM

## 2024-04-26 DIAGNOSIS — M54.2 CERVICALGIA: Primary | ICD-10-CM

## 2024-05-03 ENCOUNTER — HOSPITAL ENCOUNTER (OUTPATIENT)
Dept: RADIOLOGY | Facility: HOSPITAL | Age: 59
Discharge: HOME | End: 2024-05-03
Payer: MEDICAID

## 2024-05-03 DIAGNOSIS — M54.2 CERVICALGIA: ICD-10-CM

## 2024-05-03 DIAGNOSIS — M25.512 PAIN IN LEFT SHOULDER: ICD-10-CM

## 2024-05-03 PROCEDURE — 72040 X-RAY EXAM NECK SPINE 2-3 VW: CPT | Mod: TC,PO

## 2024-05-03 PROCEDURE — 72040 X-RAY EXAM NECK SPINE 2-3 VW: CPT | Mod: 26,,, | Performed by: RADIOLOGY

## 2024-05-03 PROCEDURE — 73030 X-RAY EXAM OF SHOULDER: CPT | Mod: TC,PO,LT

## 2024-05-03 PROCEDURE — 73030 X-RAY EXAM OF SHOULDER: CPT | Mod: 26,LT,, | Performed by: RADIOLOGY

## 2024-07-12 ENCOUNTER — HOSPITAL ENCOUNTER (OUTPATIENT)
Dept: RADIOLOGY | Facility: HOSPITAL | Age: 59
Discharge: HOME | End: 2024-07-12
Payer: MEDICAID

## 2024-07-12 DIAGNOSIS — R09.A2 FOREIGN BODY SENSATION, THROAT: Primary | ICD-10-CM

## 2024-07-12 DIAGNOSIS — R09.A2 FOREIGN BODY SENSATION, THROAT: ICD-10-CM

## 2024-07-12 PROCEDURE — 70360 X-RAY EXAM OF NECK: CPT | Mod: TC,PO

## 2024-07-12 PROCEDURE — 70360 X-RAY EXAM OF NECK: CPT | Mod: 26,,, | Performed by: RADIOLOGY

## 2024-12-02 DIAGNOSIS — Z12.31 ENCOUNTER FOR SCREENING MAMMOGRAM FOR MALIGNANT NEOPLASM OF BREAST: Primary | ICD-10-CM

## 2025-05-19 DIAGNOSIS — Z78.0 ASYMPTOMATIC MENOPAUSAL STATE: Primary | ICD-10-CM

## 2025-05-19 DIAGNOSIS — Z12.31 OTHER SCREENING MAMMOGRAM: Primary | ICD-10-CM

## 2025-06-04 ENCOUNTER — HOSPITAL ENCOUNTER (OUTPATIENT)
Dept: RADIOLOGY | Facility: HOSPITAL | Age: 60
Discharge: HOME | End: 2025-06-04
Payer: MEDICAID

## 2025-06-04 DIAGNOSIS — Z78.0 ASYMPTOMATIC MENOPAUSAL STATE: ICD-10-CM

## 2025-06-04 PROCEDURE — 77080 DXA BONE DENSITY AXIAL: CPT | Mod: 26,,, | Performed by: RADIOLOGY

## 2025-06-04 PROCEDURE — 77080 DXA BONE DENSITY AXIAL: CPT | Mod: TC,PO
